# Patient Record
Sex: FEMALE | Race: WHITE | Employment: OTHER | URBAN - METROPOLITAN AREA
[De-identification: names, ages, dates, MRNs, and addresses within clinical notes are randomized per-mention and may not be internally consistent; named-entity substitution may affect disease eponyms.]

---

## 2018-10-09 ENCOUNTER — HOSPITAL ENCOUNTER (OUTPATIENT)
Dept: GENERAL RADIOLOGY | Age: 78
Discharge: HOME | End: 2018-10-11
Payer: COMMERCIAL

## 2018-10-09 DIAGNOSIS — Z12.31 ENCOUNTER FOR SCREENING MAMMOGRAM FOR MALIGNANT NEOPLASM OF BREAST: ICD-10-CM

## 2018-10-09 PROCEDURE — 77063 BREAST TOMOSYNTHESIS BI: CPT

## 2023-10-20 ENCOUNTER — PHARMACY VISIT (OUTPATIENT)
Dept: PHARMACY | Facility: CLINIC | Age: 83
End: 2023-10-20
Payer: COMMERCIAL

## 2023-10-20 ENCOUNTER — APPOINTMENT (OUTPATIENT)
Dept: CARDIOLOGY | Facility: HOSPITAL | Age: 83
End: 2023-10-20
Payer: MEDICARE

## 2023-10-20 ENCOUNTER — APPOINTMENT (OUTPATIENT)
Dept: RADIOLOGY | Facility: HOSPITAL | Age: 83
End: 2023-10-20
Payer: MEDICARE

## 2023-10-20 ENCOUNTER — HOSPITAL ENCOUNTER (EMERGENCY)
Facility: HOSPITAL | Age: 83
Discharge: HOME | End: 2023-10-20
Attending: EMERGENCY MEDICINE
Payer: MEDICARE

## 2023-10-20 VITALS
HEART RATE: 92 BPM | RESPIRATION RATE: 16 BRPM | DIASTOLIC BLOOD PRESSURE: 96 MMHG | WEIGHT: 180 LBS | SYSTOLIC BLOOD PRESSURE: 138 MMHG | BODY MASS INDEX: 29.99 KG/M2 | TEMPERATURE: 98.5 F | HEIGHT: 65 IN | OXYGEN SATURATION: 98 %

## 2023-10-20 DIAGNOSIS — I48.91 NEW ONSET ATRIAL FIBRILLATION (MULTI): Primary | ICD-10-CM

## 2023-10-20 DIAGNOSIS — U07.1 COVID-19: ICD-10-CM

## 2023-10-20 LAB
ALBUMIN SERPL BCP-MCNC: 3.9 G/DL (ref 3.4–5)
ALP SERPL-CCNC: 44 U/L (ref 33–136)
ALT SERPL W P-5'-P-CCNC: 20 U/L (ref 7–45)
ANION GAP BLDV CALCULATED.4IONS-SCNC: 9 MMOL/L (ref 10–25)
ANION GAP SERPL CALC-SCNC: 13 MMOL/L (ref 10–20)
APTT PPP: 31 SECONDS (ref 27–38)
AST SERPL W P-5'-P-CCNC: 33 U/L (ref 9–39)
BASE EXCESS BLDV CALC-SCNC: 3.1 MMOL/L (ref -2–3)
BASOPHILS # BLD AUTO: 0.02 X10*3/UL (ref 0–0.1)
BASOPHILS NFR BLD AUTO: 0.5 %
BILIRUB SERPL-MCNC: 0.4 MG/DL (ref 0–1.2)
BNP SERPL-MCNC: 213 PG/ML (ref 0–99)
BODY TEMPERATURE: 37 DEGREES CELSIUS
BUN SERPL-MCNC: 16 MG/DL (ref 6–23)
CA-I BLDV-SCNC: 1.19 MMOL/L (ref 1.1–1.33)
CALCIUM SERPL-MCNC: 8.9 MG/DL (ref 8.6–10.3)
CARDIAC TROPONIN I PNL SERPL HS: 16 NG/L (ref 0–13)
CARDIAC TROPONIN I PNL SERPL HS: 17 NG/L (ref 0–13)
CHLORIDE BLDV-SCNC: 100 MMOL/L (ref 98–107)
CHLORIDE SERPL-SCNC: 101 MMOL/L (ref 98–107)
CO2 SERPL-SCNC: 26 MMOL/L (ref 21–32)
CREAT SERPL-MCNC: 0.69 MG/DL (ref 0.5–1.05)
D DIMER PPP FEU-MCNC: 760 NG/ML FEU
EOSINOPHIL # BLD AUTO: 0.04 X10*3/UL (ref 0–0.4)
EOSINOPHIL NFR BLD AUTO: 0.9 %
ERYTHROCYTE [DISTWIDTH] IN BLOOD BY AUTOMATED COUNT: 13.1 % (ref 11.5–14.5)
GFR SERPL CREATININE-BSD FRML MDRD: 87 ML/MIN/1.73M*2
GLUCOSE BLDV-MCNC: 112 MG/DL (ref 74–99)
GLUCOSE SERPL-MCNC: 107 MG/DL (ref 74–99)
HCO3 BLDV-SCNC: 29.5 MMOL/L (ref 22–26)
HCT VFR BLD AUTO: 43.1 % (ref 36–46)
HCT VFR BLD EST: 44 % (ref 36–46)
HGB BLD-MCNC: 14.5 G/DL (ref 12–16)
HGB BLDV-MCNC: 14.6 G/DL (ref 12–16)
IMM GRANULOCYTES # BLD AUTO: 0.01 X10*3/UL (ref 0–0.5)
IMM GRANULOCYTES NFR BLD AUTO: 0.2 % (ref 0–0.9)
INHALED O2 CONCENTRATION: 21 %
INR PPP: 1.1 (ref 0.9–1.1)
LACTATE BLDV-SCNC: 1.2 MMOL/L (ref 0.4–2)
LYMPHOCYTES # BLD AUTO: 1.72 X10*3/UL (ref 0.8–3)
LYMPHOCYTES NFR BLD AUTO: 38.8 %
MCH RBC QN AUTO: 30.9 PG (ref 26–34)
MCHC RBC AUTO-ENTMCNC: 33.6 G/DL (ref 32–36)
MCV RBC AUTO: 92 FL (ref 80–100)
MONOCYTES # BLD AUTO: 0.56 X10*3/UL (ref 0.05–0.8)
MONOCYTES NFR BLD AUTO: 12.6 %
NEUTROPHILS # BLD AUTO: 2.08 X10*3/UL (ref 1.6–5.5)
NEUTROPHILS NFR BLD AUTO: 47 %
NRBC BLD-RTO: 0 /100 WBCS (ref 0–0)
OXYHGB MFR BLDV: 33.7 % (ref 45–75)
PCO2 BLDV: 51 MM HG (ref 41–51)
PH BLDV: 7.37 PH (ref 7.33–7.43)
PLATELET # BLD AUTO: 187 X10*3/UL (ref 150–450)
PMV BLD AUTO: 9.8 FL (ref 7.5–11.5)
PO2 BLDV: 25 MM HG (ref 35–45)
POTASSIUM BLDV-SCNC: 3.7 MMOL/L (ref 3.5–5.3)
POTASSIUM SERPL-SCNC: 4 MMOL/L (ref 3.5–5.3)
PROT SERPL-MCNC: 6.7 G/DL (ref 6.4–8.2)
PROTHROMBIN TIME: 12.3 SECONDS (ref 9.8–12.8)
RBC # BLD AUTO: 4.7 X10*6/UL (ref 4–5.2)
SAO2 % BLDV: 34 % (ref 45–75)
SARS-COV-2 RNA RESP QL NAA+PROBE: DETECTED
SODIUM BLDV-SCNC: 135 MMOL/L (ref 136–145)
SODIUM SERPL-SCNC: 136 MMOL/L (ref 136–145)
WBC # BLD AUTO: 4.4 X10*3/UL (ref 4.4–11.3)

## 2023-10-20 PROCEDURE — 93005 ELECTROCARDIOGRAM TRACING: CPT

## 2023-10-20 PROCEDURE — 82330 ASSAY OF CALCIUM: CPT | Performed by: NURSE PRACTITIONER

## 2023-10-20 PROCEDURE — 36415 COLL VENOUS BLD VENIPUNCTURE: CPT | Performed by: NURSE PRACTITIONER

## 2023-10-20 PROCEDURE — 71045 X-RAY EXAM CHEST 1 VIEW: CPT | Mod: FY

## 2023-10-20 PROCEDURE — 82435 ASSAY OF BLOOD CHLORIDE: CPT | Performed by: NURSE PRACTITIONER

## 2023-10-20 PROCEDURE — 85610 PROTHROMBIN TIME: CPT | Performed by: NURSE PRACTITIONER

## 2023-10-20 PROCEDURE — 71045 X-RAY EXAM CHEST 1 VIEW: CPT | Performed by: RADIOLOGY

## 2023-10-20 PROCEDURE — 87635 SARS-COV-2 COVID-19 AMP PRB: CPT | Performed by: NURSE PRACTITIONER

## 2023-10-20 PROCEDURE — 99284 EMERGENCY DEPT VISIT MOD MDM: CPT | Performed by: EMERGENCY MEDICINE

## 2023-10-20 PROCEDURE — 83880 ASSAY OF NATRIURETIC PEPTIDE: CPT | Performed by: NURSE PRACTITIONER

## 2023-10-20 PROCEDURE — RXMED WILLOW AMBULATORY MEDICATION CHARGE

## 2023-10-20 PROCEDURE — 99283 EMERGENCY DEPT VISIT LOW MDM: CPT | Mod: 25

## 2023-10-20 PROCEDURE — 84484 ASSAY OF TROPONIN QUANT: CPT | Mod: 59 | Performed by: NURSE PRACTITIONER

## 2023-10-20 PROCEDURE — 85025 COMPLETE CBC W/AUTO DIFF WBC: CPT | Performed by: NURSE PRACTITIONER

## 2023-10-20 PROCEDURE — 85730 THROMBOPLASTIN TIME PARTIAL: CPT | Performed by: NURSE PRACTITIONER

## 2023-10-20 PROCEDURE — 85379 FIBRIN DEGRADATION QUANT: CPT | Performed by: EMERGENCY MEDICINE

## 2023-10-20 ASSESSMENT — CHA2DS2 SCORE
SEX: FEMALE
CHA2D2S VASC SCORE: 5
AGE IN YEARS: 75+
VASCULAR DISEASE: YES
HYPERTENSION: YES
DIABETES: NO
CHF OR LEFT VENTRICULAR DYSFUNCTION: NO
PRIOR STROKE OR TIA OR THROMBOEMBOLISM: NO

## 2023-10-20 ASSESSMENT — PAIN - FUNCTIONAL ASSESSMENT: PAIN_FUNCTIONAL_ASSESSMENT: 0-10

## 2023-10-20 ASSESSMENT — PAIN DESCRIPTION - PAIN TYPE: TYPE: ACUTE PAIN

## 2023-10-20 ASSESSMENT — PAIN DESCRIPTION - LOCATION: LOCATION: OTHER (COMMENT)

## 2023-10-20 NOTE — ED PROVIDER NOTES
HPI   Chief Complaint   Patient presents with    covid positive/ new onset afib       83-year-old female with a past history of hypothyroidism, hyperlipidemia, CAD with a CABG 12 to 13 years ago presents the emergency department today for new onset atrial fib.  Patient was seen at his her primary care provider office today for a COVID diagnosis with increasing fatigue was found to be in A-fib at that time.  She was did recently have an echocardiogram performed last week with her cardiologist which was within normal limits.  Denies any chest pain.  She is having slight dyspnea.  No lower extremity swelling.  Feeling overall well otherwise.  Slight cough nonproductive no hemoptysis.                    [id number] Score: 5       [place] Coma Scale Score: 15                  Patient History   No past medical history on file.  No past surgical history on file.  No family history on file.  Social History     Tobacco Use    Smoking status: Not on file    Smokeless tobacco: Not on file   Substance Use Topics    Alcohol use: Not on file    Drug use: Not on file       Physical Exam   ED Triage Vitals [10/20/23 1505]   Temp Heart Rate Resp BP   36.9 °C (98.5 °F) 104 18 108/55      SpO2 Temp Source Heart Rate Source Patient Position   97 % Temporal Monitor --      BP Location FiO2 (%)     -- --       Physical Exam  Vitals and nursing note reviewed.   Constitutional:       General: She is not in acute distress.     Appearance: Normal appearance. She is not toxic-appearing.   HENT:      Right Ear: Tympanic membrane normal.      Left Ear: Tympanic membrane normal.      Mouth/Throat:      Mouth: Mucous membranes are moist.      Pharynx: Oropharynx is clear.   Eyes:      Extraocular Movements: Extraocular movements intact.      Pupils: Pupils are equal, round, and reactive to light.   Cardiovascular:      Rate and Rhythm: Regular rhythm. Tachycardia present.      Pulses: Normal pulses.      Heart sounds: Normal heart sounds.       Comments: Irregularly irregular.  Pulmonary:      Effort: Pulmonary effort is normal.      Breath sounds: Normal breath sounds.   Abdominal:      General: Abdomen is flat. Bowel sounds are normal.      Palpations: Abdomen is soft.      Tenderness: There is no abdominal tenderness.   Musculoskeletal:         General: Normal range of motion.      Cervical back: Normal range of motion and neck supple.   Skin:     General: Skin is warm and dry.      Capillary Refill: Capillary refill takes less than 2 seconds.   Neurological:      General: No focal deficit present.      Mental Status: She is alert and oriented to person, place, and time.   Psychiatric:         Mood and Affect: Mood normal.         Behavior: Behavior normal.         Judgment: Judgment normal.         Labs Reviewed   COMPREHENSIVE METABOLIC PANEL - Abnormal       Result Value    Glucose 107 (*)     Sodium 136      Potassium 4.0      Chloride 101      Bicarbonate 26      Anion Gap 13      Urea Nitrogen 16      Creatinine 0.69      eGFR 87      Calcium 8.9      Albumin 3.9      Alkaline Phosphatase 44      Total Protein 6.7      AST 33      Bilirubin, Total 0.4      ALT 20     BLOOD GAS VENOUS FULL PANEL - Abnormal    POCT pH, Venous 7.37      POCT pCO2, Venous 51      POCT pO2, Venous 25 (*)     POCT SO2, Venous 34 (*)     POCT Oxy Hemoglobin, Venous 33.7 (*)     POCT Hematocrit Calculated, Venous 44.0      POCT Sodium, Venous 135 (*)     POCT Potassium, Venous 3.7      POCT Chloride, Venous 100      POCT Ionized Calicum, Venous 1.19      POCT Glucose, Venous 112 (*)     POCT Lactate, Venous 1.2      POCT Base Excess, Venous 3.1 (*)     POCT HCO3 Calculated, Venous 29.5 (*)     POCT Hemoglobin, Venous 14.6      POCT Anion Gap, Venous 9.0 (*)     Patient Temperature 37.0      FiO2 21     TROPONIN I, HIGH SENSITIVITY - Abnormal    Troponin I, High Sensitivity 17 (*)     Narrative:     Less than 99th percentile of normal range cutoff-  Female and children  under 18 years old <14 ng/L; Male <21 ng/L: Negative  Repeat testing should be performed if clinically indicated.     Female and children under 18 years old 14-50 ng/L; Male 21-50 ng/L:  Consistent with possible cardiac damage and possible increased clinical   risk. Serial measurements may help to assess extent of myocardial damage.     >50 ng/L: Consistent with cardiac damage, increased clinical risk and  myocardial infarction. Serial measurements may help assess extent of   myocardial damage.      NOTE: Children less than 1 year old may have higher baseline troponin   levels and results should be interpreted in conjunction with the overall   clinical context.     NOTE: Troponin I testing is performed using a different   testing methodology at [facility] than at other   [facility]. Direct result comparisons should only   be made within the same method.   B-TYPE NATRIURETIC PEPTIDE - Abnormal     (*)     Narrative:        <100 pg/mL - Heart failure unlikely  100-299 pg/mL - Intermediate probability of acute heart                  failure exacerbation. Correlate with clinical                  context and patient history.    >=300 pg/mL - Heart Failure likely. Correlate with clinical                  context and patient history.    BNP testing is performed using different testing methodology at [facility] than at other [facility]. Direct result comparisons should only be made within the same method.      SARS-COV-2 PCR, SYMPTOMATIC - Abnormal    Coronavirus 2019, PCR Detected (*)     Narrative:     This assay has received FDA Emergency Use Authorization (EUA) and is only authorized for the duration of time that circumstances exist to justify the authorization of the emergency use of in vitro diagnostic tests for the detection of SARS-CoV-2 virus and/or diagnosis of COVID-19 infection under section 564(b)(1) of the Act, 21 U.S.C. 360bbb-3(b)(1). This assay is an in vitro  diagnostic nucleic acid amplification test for the qualitative detection of SARS-CoV-2 from nasopharyngeal specimens and has been validated for use at [facility]. Negative results do not preclude COVID-19 infections and should not be used as the sole basis for diagnosis, treatment, or other management decisions.     D-DIMER, VTE EXCLUSION - Abnormal    D-Dimer, Quantitative VTE Exclusion 760 (*)     Narrative:     The VTE Exclusion D-Dimer assay is reported in ng/mL Fibrinogen Equivalent Units (FEU).    Per 's instructions for use, a value of less than 500 ng/mL (FEU) may help to exclude DVT or PE in outpatients when the assay is used with a clinical pretest probability assessment.(AE must utilize and document eCalc 'Wells Score Deep Vein Thrombosis Risk' for DVT exclusion only. Emergency Department should utilize  Guidelines for Emergency Department Use of the VTE Exclusion D-Dimer and Clinical Pretest probability assessment model for DVT or PE exclusion.)   TROPONIN I, HIGH SENSITIVITY - Abnormal    Troponin I, High Sensitivity 16 (*)     Narrative:     Less than 99th percentile of normal range cutoff-  Female and children under 18 years old <14 ng/L; Male <21 ng/L: Negative  Repeat testing should be performed if clinically indicated.     Female and children under 18 years old 14-50 ng/L; Male 21-50 ng/L:  Consistent with possible cardiac damage and possible increased clinical   risk. Serial measurements may help to assess extent of myocardial damage.     >50 ng/L: Consistent with cardiac damage, increased clinical risk and  myocardial infarction. Serial measurements may help assess extent of   myocardial damage.      NOTE: Children less than 1 year old may have higher baseline troponin   levels and results should be interpreted in conjunction with the overall   clinical context.     NOTE: Troponin I testing is performed using a different   testing methodology at [facility] than at other   system hospitals. Direct result comparisons should only   be made within the same method.   PROTIME-INR - Normal    Protime 12.3      INR 1.1     APTT - Normal    aPTT 31      Narrative:     The APTT is no longer used for monitoring Unfractionated Heparin Therapy. For monitoring Heparin Therapy, use the Heparin Assay.   CBC WITH AUTO DIFFERENTIAL    WBC 4.4      nRBC 0.0      RBC 4.70      Hemoglobin 14.5      Hematocrit 43.1      MCV 92      MCH 30.9      MCHC 33.6      RDW 13.1      Platelets 187      MPV 9.8      Neutrophils % 47.0      Immature Granulocytes %, Automated 0.2      Lymphocytes % 38.8      Monocytes % 12.6      Eosinophils % 0.9      Basophils % 0.5      Neutrophils Absolute 2.08      Immature Granulocytes Absolute, Automated 0.01      Lymphocytes Absolute 1.72      Monocytes Absolute 0.56      Eosinophils Absolute 0.04      Basophils Absolute 0.02       Pain Management Panel           No data to display              XR chest 1 view   Final Result   No acute cardiopulmonary disease.        MACRO:   none        Signed by: Anna Corea 10/20/2023 4:29 PM   Dictation workstation:   [id number]          ED Course & [facility]   ED Course as of 10/20/23 1804   Fri Oct 20, 2023   1553 D-Dimer, Quantitative Non VTE  Changed to VTE DDimer [WJ]   1622 Coronavirus 2019, PCR(!): Detected [WJ]   1622 D-Dimer, Quantitative VTE Exclusion(!): 760  Age adjusted negative, no CT needed [WJ]      ED Course User Index  [WJ] Blane Cabrera,          Diagnoses as of 10/20/23 1804   New onset atrial fibrillation (CMS/HCC)   COVID-19       Medical Decision Making  On initial evaluation patient is well-appearing in the emergency department her heart rate is anywhere between 10 4-1 20 initially.  She was given a 500 cc bolus.  Since IV fluids patient's heart rate has maintained between 88-1 08.  She has remained normotensive.  She was ambulated with pulse ox remained greater than 96%.  I was able to  get a hold of her cardiologist with [facility] who does verify a normal echocardiogram.  He recommends increasing her Coreg from 6.25 twice daily to 12-1/2 twice daily and started on Eliquis.  States that he will see her in the office on October 30 for an outpatient stress test.  I sat down for an extensive mount of time and discussed this with the patient.  She would like to go home at this time.  She feels comfortable with our plan.  Initial troponin of 17 with a repeat of 16 COVID is positive BNP of 213 CMP within normal limits D-dimer age-adjusted no need for CT PE CBC shows leukocytosis or signs of anemia chest x-ray shows no acute cardiopulmonary disease.  Patient is agreeable to the plan.  I did speak with pharmacy who will perform meds to beds for Eliquis.  I wrote down all of her outpatient follow-up and increasing in her doses I also discussed strict return precautions and risks and benefits of DOAC.  She is agreeable to this plan has no further questions or concerns she will be discharged home in stable condition.        Procedure  ECG 12 lead    Performed by: BARTOLOME Gutierrez  Authorized by: BARTOLOME Gutierrez    Comments:      New onset atrial fib rate of 126 QRS 92 QTc 41 there is no ST elevation.        I discussed the differential, results and discharge plan with the patient and/or family/friend/caregiver if present.  I emphasized the importance of follow-up with the physician I referred them to in the timeframe recommended.  I explained reasons for the patient to return to the Emergency Department. Additional verbal discharge instructions were also given and discussed with the patient to supplement those generated by the EMR. We also discussed medications that were prescribed (if any) including common side effects and interactions. The patient was advised to abstain from driving, operating heavy machinery or making significant decisions while taking medications such as  opiates and muscle relaxers that may impair this. All questions were addressed.  They understand return precautions and discharge instructions. The patient and/or family/friend/caregiver expressed understanding.           Ioana Zuniga, APRN-CNP  10/20/23 6868

## 2023-10-20 NOTE — DISCHARGE INSTRUCTIONS
INCREASE YOUR CARVEDILOL FROM 6.25 TWICE A DAY TO 12.5 MG TWICE A DAY. YOU WILL SEE YOUR CARDIOLOGST ON OCTOBER 30TH.

## 2024-01-27 ENCOUNTER — APPOINTMENT (OUTPATIENT)
Dept: CARDIOLOGY | Facility: HOSPITAL | Age: 84
End: 2024-01-27
Payer: MEDICARE

## 2024-01-27 ENCOUNTER — HOSPITAL ENCOUNTER (EMERGENCY)
Facility: HOSPITAL | Age: 84
Discharge: HOME | End: 2024-01-27
Attending: EMERGENCY MEDICINE
Payer: MEDICARE

## 2024-01-27 ENCOUNTER — APPOINTMENT (OUTPATIENT)
Dept: RADIOLOGY | Facility: HOSPITAL | Age: 84
End: 2024-01-27
Payer: MEDICARE

## 2024-01-27 VITALS
DIASTOLIC BLOOD PRESSURE: 58 MMHG | HEIGHT: 66 IN | SYSTOLIC BLOOD PRESSURE: 155 MMHG | WEIGHT: 180 LBS | OXYGEN SATURATION: 96 % | BODY MASS INDEX: 28.93 KG/M2 | HEART RATE: 57 BPM | TEMPERATURE: 98.1 F | RESPIRATION RATE: 18 BRPM

## 2024-01-27 DIAGNOSIS — I10 HYPERTENSION, UNSPECIFIED TYPE: ICD-10-CM

## 2024-01-27 DIAGNOSIS — R51.9 ACUTE NONINTRACTABLE HEADACHE, UNSPECIFIED HEADACHE TYPE: Primary | ICD-10-CM

## 2024-01-27 LAB
ALBUMIN SERPL BCP-MCNC: 3.9 G/DL (ref 3.4–5)
ALP SERPL-CCNC: 60 U/L (ref 33–136)
ALT SERPL W P-5'-P-CCNC: 23 U/L (ref 7–45)
ANION GAP SERPL CALC-SCNC: 10 MMOL/L (ref 10–20)
AST SERPL W P-5'-P-CCNC: 27 U/L (ref 9–39)
BASOPHILS # BLD AUTO: 0.07 X10*3/UL (ref 0–0.1)
BASOPHILS NFR BLD AUTO: 1.1 %
BILIRUB SERPL-MCNC: 0.4 MG/DL (ref 0–1.2)
BNP SERPL-MCNC: 288 PG/ML (ref 0–99)
BUN SERPL-MCNC: 22 MG/DL (ref 6–23)
CALCIUM SERPL-MCNC: 9 MG/DL (ref 8.6–10.3)
CARDIAC TROPONIN I PNL SERPL HS: 9 NG/L (ref 0–13)
CHLORIDE SERPL-SCNC: 102 MMOL/L (ref 98–107)
CO2 SERPL-SCNC: 29 MMOL/L (ref 21–32)
CREAT SERPL-MCNC: 0.52 MG/DL (ref 0.5–1.05)
EGFRCR SERPLBLD CKD-EPI 2021: >90 ML/MIN/1.73M*2
EOSINOPHIL # BLD AUTO: 0.3 X10*3/UL (ref 0–0.4)
EOSINOPHIL NFR BLD AUTO: 4.8 %
ERYTHROCYTE [DISTWIDTH] IN BLOOD BY AUTOMATED COUNT: 14 % (ref 11.5–14.5)
GLUCOSE SERPL-MCNC: 106 MG/DL (ref 74–99)
HCT VFR BLD AUTO: 38.5 % (ref 36–46)
HGB BLD-MCNC: 12.6 G/DL (ref 12–16)
IMM GRANULOCYTES # BLD AUTO: 0.01 X10*3/UL (ref 0–0.5)
IMM GRANULOCYTES NFR BLD AUTO: 0.2 % (ref 0–0.9)
LYMPHOCYTES # BLD AUTO: 2.11 X10*3/UL (ref 0.8–3)
LYMPHOCYTES NFR BLD AUTO: 33.8 %
MCH RBC QN AUTO: 30.3 PG (ref 26–34)
MCHC RBC AUTO-ENTMCNC: 32.7 G/DL (ref 32–36)
MCV RBC AUTO: 93 FL (ref 80–100)
MONOCYTES # BLD AUTO: 0.8 X10*3/UL (ref 0.05–0.8)
MONOCYTES NFR BLD AUTO: 12.8 %
NEUTROPHILS # BLD AUTO: 2.95 X10*3/UL (ref 1.6–5.5)
NEUTROPHILS NFR BLD AUTO: 47.3 %
NRBC BLD-RTO: 0 /100 WBCS (ref 0–0)
PLATELET # BLD AUTO: 184 X10*3/UL (ref 150–450)
POTASSIUM SERPL-SCNC: 4.3 MMOL/L (ref 3.5–5.3)
PROT SERPL-MCNC: 6.5 G/DL (ref 6.4–8.2)
RBC # BLD AUTO: 4.16 X10*6/UL (ref 4–5.2)
SODIUM SERPL-SCNC: 137 MMOL/L (ref 136–145)
WBC # BLD AUTO: 6.2 X10*3/UL (ref 4.4–11.3)

## 2024-01-27 PROCEDURE — 93005 ELECTROCARDIOGRAM TRACING: CPT

## 2024-01-27 PROCEDURE — 2500000004 HC RX 250 GENERAL PHARMACY W/ HCPCS (ALT 636 FOR OP/ED): Performed by: EMERGENCY MEDICINE

## 2024-01-27 PROCEDURE — 96374 THER/PROPH/DIAG INJ IV PUSH: CPT

## 2024-01-27 PROCEDURE — 99284 EMERGENCY DEPT VISIT MOD MDM: CPT | Performed by: EMERGENCY MEDICINE

## 2024-01-27 PROCEDURE — 70450 CT HEAD/BRAIN W/O DYE: CPT | Performed by: RADIOLOGY

## 2024-01-27 PROCEDURE — 2500000001 HC RX 250 WO HCPCS SELF ADMINISTERED DRUGS (ALT 637 FOR MEDICARE OP): Performed by: EMERGENCY MEDICINE

## 2024-01-27 PROCEDURE — 84484 ASSAY OF TROPONIN QUANT: CPT | Performed by: EMERGENCY MEDICINE

## 2024-01-27 PROCEDURE — 84075 ASSAY ALKALINE PHOSPHATASE: CPT | Performed by: EMERGENCY MEDICINE

## 2024-01-27 PROCEDURE — 83880 ASSAY OF NATRIURETIC PEPTIDE: CPT | Performed by: EMERGENCY MEDICINE

## 2024-01-27 PROCEDURE — 70450 CT HEAD/BRAIN W/O DYE: CPT

## 2024-01-27 PROCEDURE — 36415 COLL VENOUS BLD VENIPUNCTURE: CPT | Performed by: EMERGENCY MEDICINE

## 2024-01-27 PROCEDURE — 85025 COMPLETE CBC W/AUTO DIFF WBC: CPT | Performed by: EMERGENCY MEDICINE

## 2024-01-27 PROCEDURE — 99285 EMERGENCY DEPT VISIT HI MDM: CPT | Mod: 27

## 2024-01-27 RX ORDER — PROCHLORPERAZINE EDISYLATE 5 MG/ML
5 INJECTION INTRAMUSCULAR; INTRAVENOUS ONCE
Status: COMPLETED | OUTPATIENT
Start: 2024-01-27 | End: 2024-01-27

## 2024-01-27 RX ORDER — CLONIDINE HYDROCHLORIDE 0.1 MG/1
0.1 TABLET ORAL ONCE
Status: COMPLETED | OUTPATIENT
Start: 2024-01-27 | End: 2024-01-27

## 2024-01-27 RX ADMIN — CLONIDINE HYDROCHLORIDE 0.1 MG: 0.1 TABLET ORAL at 07:02

## 2024-01-27 RX ADMIN — PROCHLORPERAZINE EDISYLATE 5 MG: 5 INJECTION INTRAMUSCULAR; INTRAVENOUS at 04:13

## 2024-01-27 ASSESSMENT — LIFESTYLE VARIABLES
HAVE PEOPLE ANNOYED YOU BY CRITICIZING YOUR DRINKING: NO
HAVE YOU EVER FELT YOU SHOULD CUT DOWN ON YOUR DRINKING: NO
REASON UNABLE TO ASSESS: NO
EVER FELT BAD OR GUILTY ABOUT YOUR DRINKING: NO
EVER HAD A DRINK FIRST THING IN THE MORNING TO STEADY YOUR NERVES TO GET RID OF A HANGOVER: NO

## 2024-01-27 ASSESSMENT — COLUMBIA-SUICIDE SEVERITY RATING SCALE - C-SSRS
1. IN THE PAST MONTH, HAVE YOU WISHED YOU WERE DEAD OR WISHED YOU COULD GO TO SLEEP AND NOT WAKE UP?: NO
2. HAVE YOU ACTUALLY HAD ANY THOUGHTS OF KILLING YOURSELF?: NO
6. HAVE YOU EVER DONE ANYTHING, STARTED TO DO ANYTHING, OR PREPARED TO DO ANYTHING TO END YOUR LIFE?: NO

## 2024-01-27 ASSESSMENT — PAIN - FUNCTIONAL ASSESSMENT: PAIN_FUNCTIONAL_ASSESSMENT: 0-10

## 2024-01-27 NOTE — ED PROVIDER NOTES
Jeane Gao is a 83 y.o. patient presenting to the ED for hypertension.  She states that she was doing housework when she checked her blood pressure and found it to be elevated.  She was recently prescribed losartan to treat hypertension however it was not ready at the pharmacy and so she has not yet picked this up.  She did take one of her husbands losartan tablets however her blood pressures continue to go up.  She states that since her blood pressure has been rising she has developed headache and generally feels poorly.  She does have some mild associated nausea.  The headache was not sudden in onset nor is it especially severe.  No chest pain, shortness of breath, or other associated symptoms.    Additional History Obtained from: None  Limitations to History: None  ------------------------------------------------------------------------------------------------------------------------------------------  Physical Exam:  Appearance: Alert, cooperative,   Skin: Warm, dry, appropriate color for ethnicity.  Eyes: Cornea clear. No scleral icterus or injection.   ENT: Mucous membranes moist.  Pulmonary: No accessory muscle use or stridor. Clear lung sounds bilaterally without rhonchi or wheezing.   Cardiac: Heart sounds regular without murmur. B/L radial pulses full and symmetric.   Abdomen: Soft, not tender.  No rebound or guarding.   Musculoskeletal: No gross deformities.   Neurological: Face symmetrical. Voice clear. Appropriately conversant.  Motor and sensation intact x 4 extremities.  Normal finger-to-nose.  Psychiatric: Appropriate mood and affect.    Medical Decision Making:  Chronic Medical Conditions Significantly Affecting Care:  has no past medical history on file.  Hypertension    Social Determinants of Health Significantly Affecting Care: None identified    Differential Diagnosis Considered but not limited to: Elevated blood pressure with headache is potentially concerning however given the lack of  severity and onset after blood pressure had been rising I have overall lower suspicion for intracranial hemorrhage.  She does not have any findings suggestive of stroke.  No other other exam findings to suggest hypertensive emergency.      External Records Reviewed:   I reviewed recent and relevant outside records including:     Independent Interpretation of Studies: The following studies were ordered as part of the emergency department work up and independently interpreted by me. See ED Course for details.    EKG performed at 0257 and interpreted by me shows sinus rhythm at a rate of 51.  There is a first-degree AV block with otherwise normal intervals.  The axis is normal.  There are no ST elevations or depressions.  No T wave changes.  No STEMI.    CBC, CMP are within normal limits.  Troponin is normal.  BNP is slightly elevated without evidence of pulmonary edema on exam.    CT head is without any evidence of intracranial hemorrhage on my interpretation.  Confirmed by radiology.    Diagnoses as of 01/27/24 0735   Acute nonintractable headache, unspecified headache type   Hypertension, unspecified type         Escalation of Care:  Patient was treated with Compazine for her headache and nausea.  She did have resolution of the symptoms with this.  CT was performed less than 6 hours from onset of headache ruling out subarachnoid hemorrhage.  Remainder of workup is without evidence of hypertensive emergency.    Given that the patient does seem to be having symptomatic hypertension she was treated with clonidine.  Her blood pressure did improve with this.  She was instructed on the need to  her blood pressure medicine and take as prescribed.  Follow-up and return precautions were discussed.       Katia Alonso DO  01/29/24 4070

## 2024-01-27 NOTE — ED TRIAGE NOTES
Pt presents to the ED for HTN. States that she was working about the house and took her BP. She states that it was in the 190's. She did take 5mg of her 's losartan that she is also prescribed but has not yet picked up. Pt states that she took her BP multiple times after that and ghat the numbers kept going up. Pt alert and oriented x's 4. Pt also states that she feels dizzy/weak all over and SOB

## 2024-01-29 ENCOUNTER — TRANSCRIBE ORDERS (OUTPATIENT)
Age: 84
End: 2024-01-29

## 2024-01-29 DIAGNOSIS — I42.8 CARDIOMYOPATHY, NONCORONARY (HCC): Primary | ICD-10-CM

## 2024-01-31 LAB
ATRIAL RATE: 50 BPM
P AXIS: 77 DEGREES
PR INTERVAL: 232 MS
Q ONSET: 249 MS
QRS COUNT: 8 BEATS
QRS DURATION: 102 MS
QT INTERVAL: 489 MS
QTC CALCULATION(BAZETT): 451 MS
QTC FREDERICIA: 463 MS
R AXIS: 43 DEGREES
T AXIS: 70 DEGREES
T OFFSET: 494 MS
VENTRICULAR RATE: 51 BPM

## 2024-04-04 ENCOUNTER — OFFICE VISIT (OUTPATIENT)
Dept: PULMONOLOGY | Facility: HOSPITAL | Age: 84
End: 2024-04-04
Payer: MEDICARE

## 2024-04-04 VITALS
WEIGHT: 207.2 LBS | DIASTOLIC BLOOD PRESSURE: 50 MMHG | OXYGEN SATURATION: 95 % | RESPIRATION RATE: 16 BRPM | HEIGHT: 64 IN | SYSTOLIC BLOOD PRESSURE: 146 MMHG | TEMPERATURE: 97.8 F | HEART RATE: 46 BPM | BODY MASS INDEX: 35.37 KG/M2

## 2024-04-04 DIAGNOSIS — R05.9 COUGH, UNSPECIFIED TYPE: Primary | ICD-10-CM

## 2024-04-04 DIAGNOSIS — R06.09 DOE (DYSPNEA ON EXERTION): ICD-10-CM

## 2024-04-04 PROCEDURE — 1159F MED LIST DOCD IN RCRD: CPT | Performed by: NURSE PRACTITIONER

## 2024-04-04 PROCEDURE — 99214 OFFICE O/P EST MOD 30 MIN: CPT | Performed by: NURSE PRACTITIONER

## 2024-04-04 PROCEDURE — 1036F TOBACCO NON-USER: CPT | Performed by: NURSE PRACTITIONER

## 2024-04-04 PROCEDURE — 99204 OFFICE O/P NEW MOD 45 MIN: CPT | Performed by: NURSE PRACTITIONER

## 2024-04-04 RX ORDER — EZETIMIBE 10 MG/1
10 TABLET ORAL DAILY
COMMUNITY

## 2024-04-04 RX ORDER — CLOPIDOGREL BISULFATE 75 MG/1
75 TABLET ORAL DAILY
COMMUNITY

## 2024-04-04 RX ORDER — LEVOTHYROXINE SODIUM 137 UG/1
137 TABLET ORAL DAILY
COMMUNITY
Start: 2024-02-12

## 2024-04-04 RX ORDER — AMIODARONE HYDROCHLORIDE 200 MG/1
200 TABLET ORAL 2 TIMES DAILY
COMMUNITY
Start: 2024-03-22

## 2024-04-04 RX ORDER — BUDESONIDE, GLYCOPYRROLATE, AND FORMOTEROL FUMARATE 160; 9; 4.8 UG/1; UG/1; UG/1
2 AEROSOL, METERED RESPIRATORY (INHALATION) 2 TIMES DAILY
COMMUNITY
Start: 2024-03-27

## 2024-04-04 RX ORDER — ATORVASTATIN CALCIUM 80 MG/1
80 TABLET, FILM COATED ORAL
COMMUNITY

## 2024-04-04 RX ORDER — ALBUTEROL SULFATE 90 UG/1
2 AEROSOL, METERED RESPIRATORY (INHALATION) EVERY 4 HOURS PRN
Qty: 18 G | Refills: 3 | Status: SHIPPED | OUTPATIENT
Start: 2024-04-04

## 2024-04-04 RX ORDER — INHALER, ASSIST DEVICES
SPACER (EA) MISCELLANEOUS
Qty: 1 EACH | Refills: 1 | Status: SHIPPED | OUTPATIENT
Start: 2024-04-04

## 2024-04-04 RX ORDER — METOPROLOL SUCCINATE 100 MG/1
TABLET, EXTENDED RELEASE ORAL
COMMUNITY
Start: 2024-04-01

## 2024-04-04 RX ORDER — ISOSORBIDE MONONITRATE 30 MG/1
TABLET, EXTENDED RELEASE ORAL
COMMUNITY
Start: 2023-12-27

## 2024-04-04 RX ORDER — ASPIRIN 81 MG/1
81 TABLET ORAL
COMMUNITY
Start: 2016-09-02

## 2024-04-04 RX ORDER — CYANOCOBALAMIN 1000 UG/ML
INJECTION, SOLUTION INTRAMUSCULAR; SUBCUTANEOUS
COMMUNITY

## 2024-04-04 RX ORDER — LOSARTAN POTASSIUM 50 MG/1
50 TABLET ORAL DAILY
COMMUNITY
Start: 2024-03-19

## 2024-04-04 RX ORDER — FUROSEMIDE 40 MG/1
40 TABLET ORAL DAILY
COMMUNITY
Start: 2024-03-21

## 2024-04-04 ASSESSMENT — ENCOUNTER SYMPTOMS
OCCASIONAL FEELINGS OF UNSTEADINESS: 0
LOSS OF SENSATION IN FEET: 0
DEPRESSION: 0

## 2024-04-04 ASSESSMENT — PATIENT HEALTH QUESTIONNAIRE - PHQ9
SUM OF ALL RESPONSES TO PHQ9 QUESTIONS 1 AND 2: 0
1. LITTLE INTEREST OR PLEASURE IN DOING THINGS: NOT AT ALL
2. FEELING DOWN, DEPRESSED OR HOPELESS: NOT AT ALL

## 2024-04-04 ASSESSMENT — COLUMBIA-SUICIDE SEVERITY RATING SCALE - C-SSRS
6. HAVE YOU EVER DONE ANYTHING, STARTED TO DO ANYTHING, OR PREPARED TO DO ANYTHING TO END YOUR LIFE?: NO
2. HAVE YOU ACTUALLY HAD ANY THOUGHTS OF KILLING YOURSELF?: NO
1. IN THE PAST MONTH, HAVE YOU WISHED YOU WERE DEAD OR WISHED YOU COULD GO TO SLEEP AND NOT WAKE UP?: NO

## 2024-04-04 NOTE — PATIENT INSTRUCTIONS
"Ms. Gao,    It was a pleasure to see you in clinic today. We discussed your shortness of breath.    Based on our conversation, here are my recommendations:   - Continue your Breztri 2 puffs twice daily (RINSE MOUTH & SPIT AFTER USE) -- please start using spacer with your inhaler  - Start albuterol Inhaler 1-2 puffs every 4-6 hours as needed for shortness of breath. You can also take this 10-15 minutes prior to exertional activity to help \"prime\" your lungs.  - I ordered breathing tests (PFT & 6-minute walk) -- do not use Breztri for 48 hours prior to lung function tests  - I ordered a CT of your chest, please have this completed    - Please call your cardiologist ASAP about your leg swelling and 10 lb weight gain over the last month.     Thank you for visiting the Pulmonary clinic today!   Return to clinic 2 weeks after PFT & CT or sooner if needed   Nhung Rodriguez CNP  My office number is [phone] - Mena is my  and Louise is my nurse.     [phone] - scheduling    Any test results will be discussed at next visit -- please make sure to make a follow up appt after testing.    "

## 2024-04-04 NOTE — PROGRESS NOTES
" Patient: Jeane Gao    [id number]  : 1940 -- AGE 83 y.o.    Provider: BARTOLOME Ricks     Location [facility]   Service Date: 2024       Department of Medicine  Division of Pulmonary, Critical Care, and Sleep Medicine       [facility]  New Visit Note      HISTORY OF PRESENT ILLNESS     The patient's referring provider is: No ref. provider found    Chief complaint:   Jeane Gao is a 83 y.o. female who presents to a [facility] for an evaluation with concerns of No chief complaint on file..     HISTORY OF PRESENT ILLNESS   Ms. Gao is a 83 year old female (former smoker, quit , ~5 pack year history) with a PMHx of COVID (10/2023), HTN, L carotid endarterectomy (2020), CAD c/b MI s/p CABG x 3 (), hiatal hernia. Here for initial evaluation for shortness of breath.     I have independently interviewed and examined the patient in the office and reviewed available records.    PCP:     DATE OF LAST VISIT: N/A, NPV    Current History    On today's visit, the patient reports \"I am okay as long as I don't do anything\". She had COVID 10/2023 c/b a-fib s/p cardioversion and since then has had shortness of breath, feels it started more so after starting amiodarone. She has been on Breztri for a little over a month but does not notice much improvement in her breathing.  She has occasional dry, non-productive cough. She denies wheezing, nasal congestion, post-nasal drip, or chest pain.  She has occasional heart burn and occasionally has issues swallowing and feels is related to her hiatal hernia, has not seen general surgeon for hernia.  She has significant SOB when walking even short distances, she is unable to ride her exercise bike anymore.  She has worsening BLE edema and 10 lb weight gain over last month. Denies orthopnea.     Sleep: denies snoring, apnea, daytime drowsiness. Never had sleep " study    Denies premature birth. No childhood pulmonary issues growing up. No pulmonary hospitalizations. Has never been on home oxygen therapy before.       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19)  -Exposures: Denies known exposures to asbestos, silica, beryllium, chemicals/fumes, molds or dusts    Fam Hx:  - parents passed in MVA when she was young and unsure of their history  - brother: a-fib    Prior Notes & History       REVIEW OF SYSTEMS     REVIEW OF SYSTEMS  Review of Systems  10-point ROS complete and negative except as documented in HPI      ALLERGIES AND MEDICATIONS     ALLERGIES  Allergies   Allergen Reactions    Codeine Rash     Dizzy and difficulty breathing     Dizzy and difficulty breathing    Erythromycin Nausea Only and Rash     Dizziness     Dizziness       MEDICATIONS  Current Outpatient Medications   Medication Sig Dispense Refill    apixaban 5 mg (74 tabs) tablets,dose pack FOLLOW DIRECTIONS ON PACKAGING. Take 10MG (2 TABLETS) by mouth 2 times a day FOR 7 DAYS, THEN TAKE 5MG (1 TABLET) TWICE DAILY THEREAFTER. 74 tablet 0     No current facility-administered medications for this visit.     Facility-Administered Medications Ordered in Other Visits   Medication Dose Route Frequency Provider Last Rate Last Admin    sodium chloride 0.9 % bolus 500 mL  500 mL intravenous Once WALE Gutierrez-CNP           PAST HISTORY     IMMUNIZATION HISTORY  Immunization History   Administered Date(s) Administered    Moderna SARS-CoV-2 Vaccination 2021, 2021       PHYSICAL EXAM     VITAL SIGNS: There  were no vitals taken for this visit.     PREVIOUS WEIGHTS:  Wt Readings from Last 3 Encounters:   01/27/24 81.6 kg (180 lb)   10/20/23 81.6 kg (180 lb)       Physical Exam  Vitals reviewed.   Constitutional:       General: She is not in acute distress.     Appearance: She is obese.   HENT:      Nose: Nose normal.      Mouth/Throat:      Mouth: Mucous membranes are moist.   Eyes:      General: No scleral icterus.  Cardiovascular:      Rate and Rhythm: Normal rate and regular rhythm.      Pulses: Normal pulses.      Heart sounds: Normal heart sounds.   Pulmonary:      Effort: Pulmonary effort is normal. No respiratory distress.      Breath sounds: Normal breath sounds. No wheezing, rhonchi or rales.   Abdominal:      General: Bowel sounds are normal.      Palpations: Abdomen is soft.   Musculoskeletal:         General: Normal range of motion.      Right lower leg: Edema (2+ non-pitting) present.      Left lower leg: Edema (2+ non-pitting) present.   Skin:     General: Skin is warm and dry.   Neurological:      Mental Status: She is alert and oriented to person, place, and time.         RESULTS/DATA     Pulmonary Function Test Results     No PFT on record    Chest Radiograph     XR chest 1 view 10/20/2023    Narrative  Interpreted By:  Anna Corea,  STUDY:  XR CHEST 1 VIEW;  10/20/2023 4:22 pm    INDICATION:  Signs/Symptoms:kamara; new onset a fib.    COMPARISON:  None.    ACCESSION NUMBER(S):  [id number]    ORDERING CLINICIAN:  ARELY GREEN    FINDINGS:  The heart is normal in size. There are atherosclerotic changes of the  aorta.    There is no consolidation or pleural fluid.    The lungs appear somewhat hyperinflated.    There are degenerative changes of the spine. Sternal wires are  present.    COMPARISON OF FINDING:    Impression  No acute cardiopulmonary disease.    MACRO:  none    Signed by: Anna Corea 10/20/2023 4:29 PM  Dictation workstation:   [id number]      Chest CT Scan     No results found for this  "or any previous visit from the past 365 days.      Echocardiogram & Cardiac Studies     No results found for this or any previous visit from the past 365 days.       Labwork & Pathology   Complete Blood Count  Lab Results   Component Value Date    WBC 6.2 01/27/2024    HGB 12.6 01/27/2024    HCT 38.5 01/27/2024    MCV 93 01/27/2024     01/27/2024       Peripheral Eosinophil Count:   Eosinophils Absolute (x10*3/uL)   Date Value   01/27/2024 0.30   10/20/2023 0.04       Serum Immunoglobulin E:    No results found for: \"IGE\"     Metabolic Parameters  Sodium   Date/Time Value Ref Range Status   01/27/2024 04:09  136 - 145 mmol/L Final     Potassium   Date/Time Value Ref Range Status   01/27/2024 04:09 AM 4.3 3.5 - 5.3 mmol/L Final     Chloride   Date/Time Value Ref Range Status   01/27/2024 04:09  98 - 107 mmol/L Final     Bicarbonate   Date/Time Value Ref Range Status   01/27/2024 04:09 AM 29 21 - 32 mmol/L Final     Anion Gap   Date/Time Value Ref Range Status   01/27/2024 04:09 AM 10 10 - 20 mmol/L Final     Urea Nitrogen   Date/Time Value Ref Range Status   01/27/2024 04:09 AM 22 6 - 23 mg/dL Final     Creatinine   Date/Time Value Ref Range Status   01/27/2024 04:09 AM 0.52 0.50 - 1.05 mg/dL Final     Glucose   Date/Time Value Ref Range Status   01/27/2024 04:09  (H) 74 - 99 mg/dL Final     Calcium   Date/Time Value Ref Range Status   01/27/2024 04:09 AM 9.0 8.6 - 10.3 mg/dL Final     AST   Date/Time Value Ref Range Status   01/27/2024 04:09 AM 27 9 - 39 U/L Final     ALT   Date/Time Value Ref Range Status   01/27/2024 04:09 AM 23 7 - 45 U/L Final     Comment:     Patients treated with Sulfasalazine may generate falsely decreased results for ALT.       Bronchoscopy & Pathology/Cultures       ASSESSMENT/PLAN     Ms. Gao is a 83 year old female (former smoker, quit 1980's, ~5 pack year history) with a PMHx of COVID (10/2023), HTN, L carotid endarterectomy (07/2020), CAD c/b MI s/p CABG x 3 " (2010), hiatal hernia. Here for initial evaluation for shortness of breath.     Problem List and Orders  Problem List Items Addressed This Visit    None  Visit Diagnoses         Codes    Cough, unspecified type    -  Primary R05.9    Relevant Orders    CT chest high resolution    PUGH (dyspnea on exertion)     R06.09    Relevant Medications    inhalational spacing device (Aerochamber MV) inhaler    albuterol 90 mcg/actuation inhaler    Other Relevant Orders    CT chest high resolution    Complete Pulmonary Function Test Pre/Post Bronchodialator (Spirometry Pre/Post/DLCO/Lung Volumes)    Pulmonary Stress Test (6 Min. Walk)            Assessment and Plan / Recommendations:  # PUGH & dry cough - started 10/2023 and has progressively gotten worse - had COVID c/b a-fib and was placed on amiodarone; prior to this she was active, riding exercise bike and walking without SOB; has associated dry, non-productive cough; started on Breztri and did not notice improvement (however was not using correctly, was holding her breath prior to spraying inhaler)  - ordered HRCT to r/o amiodarone toxicity  - ordered PFT/6MW  -continue Breztri (ordered spacer for use and educated proper way to use inhaler)  -start PRN albtuerol        RTC 2 weeks after PFT & CT

## 2024-04-18 ENCOUNTER — HOSPITAL ENCOUNTER (OUTPATIENT)
Dept: RADIOLOGY | Facility: HOSPITAL | Age: 84
Discharge: HOME | End: 2024-04-18
Payer: MEDICARE

## 2024-04-18 DIAGNOSIS — R05.9 COUGH, UNSPECIFIED TYPE: ICD-10-CM

## 2024-04-18 DIAGNOSIS — R06.09 DOE (DYSPNEA ON EXERTION): ICD-10-CM

## 2024-04-18 PROCEDURE — 71250 CT THORAX DX C-: CPT

## 2024-04-18 PROCEDURE — 71250 CT THORAX DX C-: CPT | Performed by: STUDENT IN AN ORGANIZED HEALTH CARE EDUCATION/TRAINING PROGRAM

## 2024-04-23 ENCOUNTER — TELEPHONE (OUTPATIENT)
Dept: PULMONOLOGY | Facility: HOSPITAL | Age: 84
End: 2024-04-23

## 2024-04-23 DIAGNOSIS — R91.1 INCIDENTAL LUNG NODULE, GREATER THAN OR EQUAL TO 8MM: Primary | ICD-10-CM

## 2024-04-23 NOTE — TELEPHONE ENCOUNTER
Tried to call pt at [phone] regarding Ct results, but was unsuccessful. Voicemail left with instructions to return the call at [phone].

## 2024-04-24 ENCOUNTER — TELEPHONE (OUTPATIENT)
Dept: PULMONOLOGY | Facility: HOSPITAL | Age: 84
End: 2024-04-24
Payer: MEDICARE

## 2024-04-24 NOTE — TELEPHONE ENCOUNTER
Spoke with pt regarding her CT results. Per Nhung Rodriguez CNP, CT did not show any issues that would be caused by the amiodarone however there is a RUL opacity that will need a 6 month follow up CT scan. Pt verbalized understanding of results. All questions answered regarding the results. Pt mentioned that she is getting her PFT's on 5/9/24 and her FUV with Nhung Rodriguez CNP is in June. Pt prefers not to wait until June to get results of her breathing tests and is requesting a sooner visit. Pt was rescheduled on 5/15/24 with Nhung Rodriguez CNP. Her June appointment was canceled. Pt confirmed date, time and location.

## 2024-05-09 ENCOUNTER — HOSPITAL ENCOUNTER (OUTPATIENT)
Dept: RESPIRATORY THERAPY | Facility: HOSPITAL | Age: 84
Discharge: HOME | End: 2024-05-09
Payer: MEDICARE

## 2024-05-09 DIAGNOSIS — R06.09 DOE (DYSPNEA ON EXERTION): ICD-10-CM

## 2024-05-09 PROCEDURE — 94618 PULMONARY STRESS TESTING: CPT

## 2024-05-09 PROCEDURE — 94640 AIRWAY INHALATION TREATMENT: CPT

## 2024-05-09 PROCEDURE — 2500000001 HC RX 250 WO HCPCS SELF ADMINISTERED DRUGS (ALT 637 FOR MEDICARE OP): Performed by: NURSE PRACTITIONER

## 2024-05-09 PROCEDURE — 94726 PLETHYSMOGRAPHY LUNG VOLUMES: CPT

## 2024-05-09 RX ORDER — ALBUTEROL SULFATE 90 UG/1
4 AEROSOL, METERED RESPIRATORY (INHALATION) ONCE
Status: COMPLETED | OUTPATIENT
Start: 2024-05-09 | End: 2024-05-09

## 2024-05-09 RX ADMIN — ALBUTEROL SULFATE 4 PUFF: 90 AEROSOL, METERED RESPIRATORY (INHALATION) at 10:34

## 2024-05-09 ASSESSMENT — PAIN - FUNCTIONAL ASSESSMENT: PAIN_FUNCTIONAL_ASSESSMENT: 0-10

## 2024-05-09 ASSESSMENT — PAIN SCALES - GENERAL: PAINLEVEL_OUTOF10: 0 - NO PAIN

## 2024-05-09 NOTE — PROGRESS NOTES
Home O2 Evaluation:    SpO2 on room air at rest:     95%    SpO2 on room air with exertion:     93%    SpO2 on oxygen at rest:     %    SpO2 on oxygen with exertion:     %    SpO2 on 4L/min O2 with exertion:    %    Ambulation distance:     684 ft    Recommended O2 device: RA    Recommended O2 L/min:    Recommended FiO2 %:     Pre/Post HR: 42/63 bpm. SOB noted w/ exertion.    Charley Mireles, RRT

## 2024-05-10 LAB
MGC ASCENT PFT - FEV1 - POST: 1.18
MGC ASCENT PFT - FEV1 - PRE: 0.93
MGC ASCENT PFT - FEV1 - PREDICTED: 1.89
MGC ASCENT PFT - FVC - POST: 2.03
MGC ASCENT PFT - FVC - PRE: 1.47
MGC ASCENT PFT - FVC - PREDICTED: 2.51

## 2024-05-15 ENCOUNTER — OFFICE VISIT (OUTPATIENT)
Dept: PULMONOLOGY | Facility: CLINIC | Age: 84
End: 2024-05-15
Payer: MEDICARE

## 2024-05-15 VITALS
HEIGHT: 64 IN | WEIGHT: 208 LBS | DIASTOLIC BLOOD PRESSURE: 66 MMHG | RESPIRATION RATE: 16 BRPM | SYSTOLIC BLOOD PRESSURE: 114 MMHG | OXYGEN SATURATION: 96 % | TEMPERATURE: 98.7 F | BODY MASS INDEX: 35.51 KG/M2 | HEART RATE: 45 BPM

## 2024-05-15 DIAGNOSIS — R00.1 BRADYCARDIA: ICD-10-CM

## 2024-05-15 DIAGNOSIS — R09.82 POST-NASAL DRIP: ICD-10-CM

## 2024-05-15 DIAGNOSIS — J44.9 CHRONIC OBSTRUCTIVE PULMONARY DISEASE, UNSPECIFIED COPD TYPE (MULTI): Primary | ICD-10-CM

## 2024-05-15 DIAGNOSIS — R06.02 SHORTNESS OF BREATH: ICD-10-CM

## 2024-05-15 DIAGNOSIS — M79.89 LEG SWELLING: ICD-10-CM

## 2024-05-15 DIAGNOSIS — R91.1 PULMONARY NODULE: ICD-10-CM

## 2024-05-15 PROCEDURE — 99214 OFFICE O/P EST MOD 30 MIN: CPT | Performed by: NURSE PRACTITIONER

## 2024-05-15 PROCEDURE — 1159F MED LIST DOCD IN RCRD: CPT | Performed by: NURSE PRACTITIONER

## 2024-05-15 PROCEDURE — 1036F TOBACCO NON-USER: CPT | Performed by: NURSE PRACTITIONER

## 2024-05-15 RX ORDER — IPRATROPIUM BROMIDE AND ALBUTEROL SULFATE 2.5; .5 MG/3ML; MG/3ML
3 SOLUTION RESPIRATORY (INHALATION) EVERY 4 HOURS PRN
Qty: 1620 ML | Refills: 11 | Status: SHIPPED | OUTPATIENT
Start: 2024-05-15

## 2024-05-15 NOTE — PROGRESS NOTES
Patient: Jeane Gao    [id number]  : 1940 -- AGE 83 y.o.    Provider: BARTOLOME Ricks     Location [facility]   Service Date: 5/15/2024       Department of Medicine  Division of Pulmonary, Critical Care, and Sleep Medicine       [facility]  Established Patient Visit Note      HISTORY OF PRESENT ILLNESS     The patient's referring provider is: No ref. provider found    Chief complaint: Jeane Gao is a 83 y.o. female who presents to a [facility] for an evaluation with concerns of Shortness of Breath (Patient is here for follow up visit. Patient states she is getting shortness of breath on exertion states she is feeling the same. Patient states she has dry cough. Patient using rescue inhaler when needed. Patient had PFT and 6 min walk done also had CT scan done. ).       HISTORY OF PRESENT ILLNESS:   Ms. Gao is a 83 year old female (former smoker, quit , ~5 pack year history) with a PMHx of severe COPD (GOLD 3 FEV1 49%, +BD response), COVID (10/2023), HTN, L carotid endarterectomy (2020), CAD c/b MI s/p CABG x 3 (), hiatal hernia, hypothyroidism. Here for folow-up visit for shortness of breath.     I have independently interviewed and examined the patient in the office and reviewed available records.    DATE OF LAST VISIT: 24    Current History    On today's visit, the patient reports that she is still having a difficult time with shortness of breath. She has Breztri and is using every morning but not consistently at night, if she feels her breathing is not too bad she will only use albuterol instead of Breztri at night. She denies cough or sputum. Having some post-nasal drip with throat irritation currently. Denies nasal congestion, wheezing, chest pain/tightness. Denies acid reflux. She is concerned about taking amiodarone because she has been reading about the side effects and is  "worried it is causing some of her shortness of breath - encouraged her to discuss these concerns with cardiology. She has worsening swelling in her legs/ankles and ongoing weight gain. She is on lasix and reports she is taking it daily as prescribed.     Reviewed CT results, advised has RUL nodule that will need repeat CT in 6 months. Advised could be from an inflammatory process but could also be malignancy and next steps will depend on what the next CT shows. She verbalized understanding.    Reviewed PFT & 6MW results - advised based on 6MW she does not need oxygen as her pulse ox stayed about 88%, lowest was 93% with ambulation. PFT showing severe COPD (GOLD 3 - FEV1 49%) with significant BD response. DLCO WNL.       Prior Notes & History     4/4/24: On today's visit, the patient reports \"I am okay as long as I don't do anything\". She had COVID 10/2023 c/b a-fib s/p cardioversion and since then has had shortness of breath, feels it started more so after starting amiodarone. She has been on Breztri for a little over a month but does not notice much improvement in her breathing.  She has occasional dry, non-productive cough. She denies wheezing, nasal congestion, post-nasal drip, or chest pain.  She has occasional heart burn and occasionally has issues swallowing and feels is related to her hiatal hernia, has not seen general surgeon for hernia.  She has significant SOB when walking even short distances, she is unable to ride her exercise bike anymore.  She has worsening BLE edema and 10 lb weight gain over last month. Denies orthopnea.      Sleep: denies snoring, apnea, daytime drowsiness. Never had sleep study     Denies premature birth. No childhood pulmonary issues growing up. No pulmonary hospitalizations. Has never been on home oxygen therapy before.      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19)  -Exposures: +2nd hand smoke exposure, 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; Denies known exposures to asbestos, silica, beryllium, molds or dusts    REVIEW OF SYSTEMS     REVIEW OF SYSTEMS  Review of Systems  10-point ROS complete and negative except as documented in HPI    ALLERGIES AND MEDICATIONS     ALLERGIES  Allergies   Allergen Reactions    Codeine Rash     Dizzy and difficulty breathing     Dizzy and difficulty breathing    Erythromycin Nausea Only and Rash     Dizziness     Dizziness       MEDICATIONS  Current Outpatient Medications   Medication Sig Dispense Refill    albuterol 90 mcg/actuation inhaler Inhale 2 puffs every 4 hours if needed for wheezing or shortness of breath. 18 g 3    amiodarone (Pacerone) 200 mg tablet Take 1 tablet (200 mg) by mouth 2 times a day.      apixaban 5 mg (74 tabs) tablets,dose pack FOLLOW DIRECTIONS ON PACKAGING. Take 10MG (2 TABLETS) by mouth 2 times a day FOR 7 DAYS, THEN TAKE 5MG (1 TABLET) TWICE DAILY THEREAFTER. 74 tablet 0    aspirin 81 mg EC tablet Take 1 tablet (81 mg) by mouth once daily.      atorvastatin (Lipitor) 80 mg tablet Take 1 tablet (80 mg) by mouth once daily in the morning. Take before meals.      Breztri Aerosphere 160-9-4.8 mcg/actuation HFA aerosol inhaler Inhale 2 puffs 2 times a day.      clopidogrel (Plavix) 75 mg tablet Take 1 tablet (75 mg) by mouth once daily.      cyanocobalamin (Vitamin B-12) 1,000 mcg/mL injection INJECT 1 ML INTO THE MUSCLE ONCE MONTHLY      ezetimibe (Zetia) 10 mg tablet Take 1 tablet (10 mg) by  "mouth once daily.      furosemide (Lasix) 40 mg tablet Take 1 tablet (40 mg) by mouth once daily.      inhalational spacing device (Aerochamber MV) inhaler Use as instructed 1 each 1    isosorbide mononitrate ER (Imdur) 30 mg 24 hr tablet       levothyroxine (Synthroid, Levoxyl) 137 mcg tablet Take 1 tablet (137 mcg) by mouth once daily.      losartan (Cozaar) 50 mg tablet Take 1 tablet (50 mg) by mouth once daily.      metoprolol succinate XL (Toprol-XL) 100 mg 24 hr tablet        No current facility-administered medications for this visit.     Facility-Administered Medications Ordered in Other Visits   Medication Dose Route Frequency Provider Last Rate Last Admin    sodium chloride 0.9 % bolus 500 mL  500 mL intravenous Once Ioana Zuniga, APRN-CNP           PAST HISTORY     PAST MEDICAL HISTORY  She  has no past medical history on file.    PAST SURGICAL HISTORY  History reviewed. No pertinent surgical history.    IMMUNIZATION HISTORY  Immunization History   Administered Date(s) Administered    Moderna SARS-CoV-2 Vaccination 03/08/2021, 04/07/2021       PHYSICAL EXAM     VITAL SIGNS: /66   Pulse (!) 45   Temp 37.1 °C (98.7 °F)   Resp 16   Ht 1.626 m (5' 4\")   Wt 94.3 kg (208 lb)   SpO2 96%   BMI 35.70 kg/m²      PREVIOUS WEIGHTS:  Wt Readings from Last 3 Encounters:   05/15/24 94.3 kg (208 lb)   04/04/24 94 kg (207 lb 3.2 oz)   01/27/24 81.6 kg (180 lb)       Physical Exam  Vitals reviewed.   Constitutional:       General: She is not in acute distress.     Appearance: She is not ill-appearing.   HENT:      Mouth/Throat:      Comments: Hoarse voice with throat clearing at times   Eyes:      General: No scleral icterus.  Cardiovascular:      Rate and Rhythm: Regular rhythm. Bradycardia present.   Pulmonary:      Effort: Pulmonary effort is normal. No respiratory distress.      Breath sounds: Normal breath sounds. No wheezing, rhonchi or rales.   Musculoskeletal:      Right lower leg: Edema (2+ " feet, ankles and legs up to knees) present.      Left lower leg: Edema (2+ feet, ankles and legs up to knees) present.   Skin:     General: Skin is warm and dry.   Neurological:      Mental Status: She is alert and oriented to person, place, and time.         RESULTS/DATA     Pulmonary Function Test Results     6MW 5/9/24:  The patient underwent a 6-minute walk. The patient ambulated for total of 684 feet. The resting room air saturation was 95%. The lowest recorded ambulatory sat on room air was 93%. There is no significant desaturation while ambulating on room air.     PFT 5/9/24:  Impression: 1.  Severe airflow obstruction with response to an aerosolized bronchodilator. 2.  Normal total lung capacity with evidence of early airway closure and gas trapping 3.  Diffusion capacity within the normal limits    Chest Radiograph     XR chest 1 view 10/20/2023    Narrative  Interpreted By:  Anna Corea,  STUDY:  XR CHEST 1 VIEW;  10/20/2023 4:22 pm    INDICATION:  Signs/Symptoms:kamara; new onset a fib.    COMPARISON:  None.    ACCESSION NUMBER(S):  [id number]    ORDERING CLINICIAN:  ARELY GRENE    FINDINGS:  The heart is normal in size. There are atherosclerotic changes of the  aorta.    There is no consolidation or pleural fluid.    The lungs appear somewhat hyperinflated.    There are degenerative changes of the spine. Sternal wires are  present.    COMPARISON OF FINDING:    Impression  No acute cardiopulmonary disease.    MACRO:  none    Signed by: Anna Corea 10/20/2023 4:29 PM  Dictation workstation:   [id number]      Chest CT Scan     CT chest high resolution 04/18/2024    Narrative  Interpreted By:  Georges Lawson,  and Dong Vogel  STUDY:  CT CHEST HIGH RESOLUTION;  4/18/2024 5:52 pm    INDICATION:  Signs/Symptoms:r/o amiodarone toxicity.    Per EMR: 83-year-old female with history of CAD status post CABG, and  atrial fibrillation on amiodarone presenting with increasing  shortness of breath and decreased  functional activity.    COMPARISON:  Chest x-ray 10/20/2023    ACCESSION NUMBER(S):  [id number]    ORDERING CLINICIAN:  RODGER HAMMOND    TECHNIQUE:  Using helical multi-detector technique, volumetric data acquisition  of the chest was obtained without intravenous administration of  contrast material under the high resolution chest CT protocol.  Examination includes contiguous slices through the chest in supine  positioning during inspiration, noncontiguous axial slices in supine  positioning during expiration and prone positioning during  inspiration.    FINDINGS:  LUNGS AND AIRWAYS:  The trachea and central airways are patent. No endobronchial lesion  is seen.    There is a ground-glass 1.4 x 0.9 cm peribronchial apical right upper  lobe nodule with internal cystic lucencies (series 6, image 71).  Additional few sub 5 mm solid nodules seen within remainder of  bilateral lungs, likely benign. Otherwise, the bilateral lungs are  otherwise clear without evidence of focal consolidation, pleural  effusion, or pneumothorax. Expiratory imaging demonstrates no  evidence of air-trapping. Prone imaging reveals improvement of  bibasilar atelectasis and no evidence of pulmonary fibrosis.    MEDIASTINUM AND CALOS, LOWER NECK AND AXILLA:  The visualized thyroid gland is within normal limits.  There are multiple prominent to borderline enlarged intrathoracic  lymph nodes seen, measuring up to1.0 cm, which are nonspecific, but  are felt to be likely reactive in nature. There is a smallsized  hiatal hernia. Otherwise, the esophagus is within normal limits.    HEART AND VESSELS:  The thoracic aorta is normal in course and caliber.There is moderate  calcified atherosclerosis present. The pulmonary artery is mildly  dilated, measuring 3.5 cm at the trunk. There is a normal ratio of  arterial caliber between the pulmonary artery and the thoracic aorta.  Severe coronary artery calcifications are seen. Please note,the study  is not  optimized for evaluation of coronary arteries.Status post  coronary artery bypass grafting (CABG) with bypass graft patency  could not be evaluated on present non contrast study. The mild  cardiomegaly with multichamber enlargement, most significantly  involving the right atrium..There are prominent aortic and mitral  annular calcifications seen. Correlate with aortic stenosis,  cardiovascular risk factors and patient's symptoms. There is no  pericardial effusion seen.    UPPER ABDOMEN:  There is relative hyperdensity of the liver which may be secondary to  amiodarone deposition. There left renal lesion, measuring2.1 cm,  demonstrating homogeneous attenuation and thin wall with no internal  septation/calcification or mural nodule. Although, not fully  characterized, the internal low attenuation of -10 to 20 HU, favors  underlying benign etiology/simple cyst. (Hernusrat BR, Fili SG,  Dmitri NM, et al. Management of the Incidental Renal Mass on CT: A  White Paper of the ACR Incidental Findings Committee. J Am Jaqui  Radiol. 2018;15(2):264-273.)    CHEST WALL AND OSSEOUS STRUCTURES:  The patient is status post median sternotomy. Otherwise, the chest  wall is within normal limits. No acute osseous pathology.There are no  suspicious osseous lesions.Degenerative changes of visualized spine  as well as bilateral shoulders.    Impression  1. A 1.4 cm right upper lobe ground-glass pulmonary nodule with  internal lucencies. Given the morphology of this nodule, underlying  primary lung malignancy is not excluded and attention on follow-up CT  scan within 6-12 months is recommended.  2. No acute cardiopulmonary pathology. No evidence of interstitial  lung disease.  3. Cardiomegaly and pulmonary artery enlargement. Consider  correlation with echocardiography.  4. Additional findings as above.    I personally reviewed the images/study and I agree with the resident  findings as stated by Lela Umana MD. This study was  "interpreted at  [facility], [place], [place].    MACRO:  Incidental Finding:   A ground glass pulmonary nodule measuring 6 mm  or larger.  (**-YCF-**)    Instructions:  Consider follow up non contrast chest CT at 6-12  months to confirm persistence, then consider CT chest every 2 years  until 5 years. (Carlitos Yo et al., Guidelines for management of  incidental pulmonary nodules detected on CT images: From the  Fleischner Society 2017, Radiology. 2017 Jul;284 (1):228-243.)  [email or web address]    Signed by: Georges Lawson 4/19/2024 10:00 AM  Dictation workstation:   [id number]      Echocardiogram & Cardiac Studies     No results found for this or any previous visit from the past 365 days.       Labwork & Pathology   Complete Blood Count  Lab Results   Component Value Date    WBC 6.2 01/27/2024    HGB 12.6 01/27/2024    HCT 38.5 01/27/2024    MCV 93 01/27/2024     01/27/2024       Peripheral Eosinophil Count:   Eosinophils Absolute (x10*3/uL)   Date Value   01/27/2024 0.30   10/20/2023 0.04       Serum Immunoglobulin E:    No results found for: \"IGE\"     Metabolic Parameters  Sodium   Date/Time Value Ref Range Status   01/27/2024 04:09  136 - 145 mmol/L Final     Potassium   Date/Time Value Ref Range Status   01/27/2024 04:09 AM 4.3 3.5 - 5.3 mmol/L Final     Chloride   Date/Time Value Ref Range Status   01/27/2024 04:09  98 - 107 mmol/L Final     Bicarbonate   Date/Time Value Ref Range Status   01/27/2024 04:09 AM 29 21 - 32 mmol/L Final     Anion Gap   Date/Time Value Ref Range Status   01/27/2024 04:09 AM 10 10 - 20 mmol/L Final     Urea Nitrogen   Date/Time Value Ref Range Status   01/27/2024 04:09 AM 22 6 - 23 mg/dL Final     Creatinine   Date/Time Value Ref Range Status   01/27/2024 04:09 AM 0.52 0.50 - 1.05 mg/dL Final     Glucose   Date/Time Value Ref Range Status   01/27/2024 04:09  (H) 74 - 99 mg/dL Final     Calcium   Date/Time Value Ref Range " Status   01/27/2024 04:09 AM 9.0 8.6 - 10.3 mg/dL Final     AST   Date/Time Value Ref Range Status   01/27/2024 04:09 AM 27 9 - 39 U/L Final     ALT   Date/Time Value Ref Range Status   01/27/2024 04:09 AM 23 7 - 45 U/L Final     Comment:     Patients treated with Sulfasalazine may generate falsely decreased results for ALT.         ASSESSMENT/PLAN   Ms. Gao is a 83 year old female (former smoker, quit 1980's, ~5 pack year history) with a PMHx of severe COPD (GOLD 3 FEV1 49%, +BD response), COVID (10/2023), HTN, L carotid endarterectomy (07/2020), CAD c/b MI s/p CABG x 3 (2010), hiatal hernia, hypothyroidism. Here for folow-up visit for shortness of breath.     Problem List and Orders  Problem List Items Addressed This Visit    None  Visit Diagnoses         Codes    Chronic obstructive pulmonary disease, unspecified COPD type (Multi)    -  Primary J44.9    Relevant Medications    ipratropium-albuteroL (Duo-Neb) 0.5-2.5 mg/3 mL nebulizer solution    Other Relevant Orders    Home nebulizer    Shortness of breath     R06.02    Relevant Orders    Transthoracic Echo Complete    Leg swelling     M79.89    Relevant Orders    Transthoracic Echo Complete            Assessment and Plan / Recommendations:  1) Severe COPD (GOLD 3, FEV1 49%, +BD response) - DLCO WNL; currently on Breztri and PRN albuterol, not using Breztri consistently at night but is using every morning; no acute exacerbation, no wheezing   - advised to use Breztri consistently 2 puffs twice daily   - ordered nebulizer machine & duonbebs  - duonebs or albuteorl MDI q4-6hr PRN SOB  - if not improved and no cardiac findings on echo that may explain SOB, may consider changing to Trelegy at next appt to see if has better symptom management     2) Shortness of breath - progressive, having SOB at rest and with very minimal exertion; having worsening leg swelling; HRCT without acute cardiopulmonary pathology and no evidence of interstitial lung disease or pulmonary  fibrosis  - ordered stat echo    3) Bradycardia - HR in 40's during visit, patient denies dizziness, only having SOB; also HRCT noted severe coronary artery calcifications, cardiomegaly and pulmonary artery enlargement with recommendation for echo   - has follow-up with cardiology next week; advised to call PCP or cardiology ASAP to discuss if medications need adjusted even prior to cardiology appt    4) Pulmonary nodule - 1.4 cm RUL ground-glass nodule with internal lucencies, malignancy not excluded and recomment repeat in 6-12 months; inflammation vs malignancy  - repeat CT chest ordered, advised to schedule to be completed around 10/18/24 (6 months from initial CT), she verbalized understanding     5) Post-nasal drip - causing throat clearing and sore throat  -start claritin once daily       RTC 6-8 weeks

## 2024-05-15 NOTE — PATIENT INSTRUCTIONS
"Ms. Jeane Gao    It was a pleasure to see you in clinic today. We discussed your shortness of breath, COPD and CT results.    Based on our conversation, here are my recommendations:   - Continue your Breztri 2 puffs twice daily (RINSE MOUTH & SPIT AFTER USE) -- please start using spacer with your inhaler  - Continue Duonebs or albuterol Inhaler 1-2 puffs every 4-6 hours as needed for shortness of breath. You can also take this 10-15 minutes prior to exertional activity to help \"prime\" your lungs.  - I ordered a nebulizer machine for the duonebs.   - Start loratadine (claritin) once daily to see if helps with post-nasal drip.  - Your chest CT showed a nodule in your right lung. I ordered a repeat CT scan, please schedule this to be completed around 10/18/24  - I ordered an echocardiogram, please schedule these.   - Please call your cardiologist or PCP about your heart rate being 47 to determine if any medications need adjusted.     Thank you for visiting the Pulmonary clinic today!   Return to clinic 6-8 weeks or sooner if needed   Nhung Rodriguez CNP  My office number is [phone] - Mena is my  and Louise is my nurse.     [phone] - scheduling    Any test results will be discussed at next visit -- please make sure to make a follow up appt after testing.  "

## 2024-05-22 ENCOUNTER — HOSPITAL ENCOUNTER (OUTPATIENT)
Dept: CARDIOLOGY | Facility: HOSPITAL | Age: 84
Discharge: HOME | End: 2024-05-22
Payer: MEDICARE

## 2024-05-22 DIAGNOSIS — R06.02 SHORTNESS OF BREATH: ICD-10-CM

## 2024-05-22 DIAGNOSIS — M79.89 LEG SWELLING: ICD-10-CM

## 2024-05-22 PROCEDURE — 93306 TTE W/DOPPLER COMPLETE: CPT

## 2024-05-22 PROCEDURE — 93306 TTE W/DOPPLER COMPLETE: CPT | Performed by: INTERNAL MEDICINE

## 2024-05-23 LAB
AORTIC VALVE MEAN GRADIENT: 5.9 MMHG
AORTIC VALVE PEAK VELOCITY: 1.58 M/S
AV PEAK GRADIENT: 10 MMHG
AVA (PEAK VEL): 2.28 CM2
AVA (VTI): 2.39 CM2
EJECTION FRACTION APICAL 4 CHAMBER: 61.1
LEFT ATRIUM VOLUME AREA LENGTH INDEX BSA: 34.1 ML/M2
LEFT VENTRICLE INTERNAL DIMENSION DIASTOLE: 4.75 CM (ref 3.5–6)
LEFT VENTRICULAR OUTFLOW TRACT DIAMETER: 2.02 CM
LV EJECTION FRACTION BIPLANE: 60 %
MITRAL VALVE E/A RATIO: 3.07
MITRAL VALVE E/E' RATIO: 13.1
RIGHT VENTRICLE FREE WALL PEAK S': 9.5 CM/S
RIGHT VENTRICLE PEAK SYSTOLIC PRESSURE: 43.7 MMHG
TRICUSPID ANNULAR PLANE SYSTOLIC EXCURSION: 2.4 CM

## 2024-05-30 ENCOUNTER — TELEPHONE (OUTPATIENT)
Dept: SLEEP MEDICINE | Facility: CLINIC | Age: 84
End: 2024-05-30
Payer: MEDICARE

## 2024-05-30 NOTE — TELEPHONE ENCOUNTER
Called and spoke with patient about results. Patient asked me to fax to DR. Prado office sent on 5/30/24. Patient had acknowledgement and understanding.       ----- Message from BARTOLOME Ricks sent at 5/29/2024  4:46 PM EDT -----  Please call Ms. Gao and let her know that her echocardiogram shows her heart is pumping effectively. Her left atrium is slightly enlarged, she should follow up with her cardiologist.

## 2024-06-06 ENCOUNTER — APPOINTMENT (OUTPATIENT)
Dept: PULMONOLOGY | Facility: HOSPITAL | Age: 84
End: 2024-06-06
Payer: MEDICARE

## 2024-07-08 ENCOUNTER — OFFICE VISIT (OUTPATIENT)
Dept: PULMONOLOGY | Facility: CLINIC | Age: 84
End: 2024-07-08
Payer: MEDICARE

## 2024-07-08 VITALS
TEMPERATURE: 98.4 F | OXYGEN SATURATION: 96 % | HEIGHT: 64 IN | DIASTOLIC BLOOD PRESSURE: 53 MMHG | SYSTOLIC BLOOD PRESSURE: 144 MMHG | HEART RATE: 50 BPM | RESPIRATION RATE: 16 BRPM | BODY MASS INDEX: 35.51 KG/M2 | WEIGHT: 208 LBS

## 2024-07-08 DIAGNOSIS — R91.1 PULMONARY NODULE: ICD-10-CM

## 2024-07-08 DIAGNOSIS — R06.09 DOE (DYSPNEA ON EXERTION): ICD-10-CM

## 2024-07-08 DIAGNOSIS — J44.9 CHRONIC OBSTRUCTIVE PULMONARY DISEASE, UNSPECIFIED COPD TYPE (MULTI): Primary | ICD-10-CM

## 2024-07-08 PROCEDURE — 99214 OFFICE O/P EST MOD 30 MIN: CPT | Performed by: NURSE PRACTITIONER

## 2024-07-08 PROCEDURE — 1159F MED LIST DOCD IN RCRD: CPT | Performed by: NURSE PRACTITIONER

## 2024-07-08 PROCEDURE — 1036F TOBACCO NON-USER: CPT | Performed by: NURSE PRACTITIONER

## 2024-07-08 NOTE — PROGRESS NOTES
Patient: Jeane Gao    [id number]  : 1940 -- AGE 83 y.o.    Provider: BARTOLOME Ricks     Location [facility]   Service Date: 2024       Department of Medicine  Division of Pulmonary, Critical Care, and Sleep Medicine       [facility]  Established Patient Visit Note      HISTORY OF PRESENT ILLNESS     The patient's referring provider is: No ref. provider found    Chief complaint: Jeane Gao is a 83 y.o. female who presents to a [facility] for an evaluation with concerns of Shortness of Breath (Patient is here for follow up visit. Patient  states her inhaler are working well. Patient states she has occasional dry cough. Patient is using 3x daily. ).       HISTORY OF PRESENT ILLNESS:  Ms. Gao is a 83 year old female (former smoker, quit , ~5 pack year history) with a PMHx of severe COPD (GOLD 3 FEV1 49%, +BD response), COVID (10/2023), HTN, L carotid endarterectomy (2020), CAD c/b MI s/p CABG x 3 (), hiatal hernia, hypothyroidism. Here for folow-up visit for COPD.    I have independently interviewed and examined the patient in the office and reviewed available records.    DATE OF LAST VISIT: 5/15/24    Current History    On today's visit, the patient reports she has been using her Breztri BID as recommended and her breathing is somewhat improved. She is still having PUGH with strenuous activity and does not feel she can be as active as she was in the past. She is using Breztri BID and was using duonebs TID because she thought she was supposed to be using it scheduled TID. Had occasional cough, non-productive but feels she has sputum she is unable to expectorate; she states she was given prescription for mucinex by other provider but did not start because she was afraid to use it. Denies wheezing, nasal congestion, post-nasal drip, chest pain/tightness.     Denies recent visits to  "[facility], ER, PCP.    Prior Notes & History     5/15/24: On today's visit, the patient reports that she is still having a difficult time with shortness of breath. She has Breztri and is using every morning but not consistently at night, if she feels her breathing is not too bad she will only use albuterol instead of Breztri at night. She denies cough or sputum. Having some post-nasal drip with throat irritation currently. Denies nasal congestion, wheezing, chest pain/tightness. Denies acid reflux. She is concerned about taking amiodarone because she has been reading about the side effects and is worried it is causing some of her shortness of breath - encouraged her to discuss these concerns with cardiology. She has worsening swelling in her legs/ankles and ongoing weight gain. She is on lasix and reports she is taking it daily as prescribed.      Reviewed CT results, advised has RUL nodule that will need repeat CT in 6 months. Advised could be from an inflammatory process but could also be malignancy and next steps will depend on what the next CT shows. She verbalized understanding.     Reviewed PFT & 6MW results - advised based on 6MW she does not need oxygen as her pulse ox stayed about 88%, lowest was 93% with ambulation. PFT showing severe COPD (GOLD 3 - FEV1 49%) with significant BD response. DLCO WNL.     4/4/24: On today's visit, the patient reports \"I am okay as long as I don't do anything\". She had COVID 10/2023 c/b a-fib s/p cardioversion and since then has had shortness of breath, feels it started more so after starting amiodarone. She has been on Breztri for a little over a month but does not notice much improvement in her breathing.  She has occasional dry, non-productive cough. She denies wheezing, nasal congestion, post-nasal drip, or chest pain.  She has occasional heart burn and occasionally has issues swallowing and feels is related to her hiatal hernia, has not seen general surgeon for hernia.  She has " significant SOB when walking even short distances, she is unable to ride her exercise bike anymore.  She has worsening BLE edema and 10 lb weight gain over last month. Denies orthopnea.      Sleep: denies snoring, apnea, daytime drowsiness. Never had sleep study     Denies premature birth. No childhood pulmonary issues growing up. No pulmonary hospitalizations. Has never been on home oxygen therapy before.      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; Denies known exposures to asbestos, silica, beryllium, molds or dusts    REVIEW OF SYSTEMS     REVIEW OF SYSTEMS  Review of Systems  10-point ROS complete and negative except as documented in HPI     ALLERGIES AND MEDICATIONS     ALLERGIES  Allergies   Allergen Reactions    Codeine Rash     Dizzy and difficulty breathing     Dizzy and difficulty breathing    Erythromycin Nausea Only and Rash     Dizziness     Dizziness       MEDICATIONS  Current Outpatient Medications   Medication Sig Dispense Refill    albuterol 90 mcg/actuation inhaler Inhale 2 puffs every 4 hours if needed for wheezing or shortness of breath. 18 g 3    amiodarone (Pacerone) 200 mg tablet Take 1 tablet (200 mg) by mouth 2 times a day.      apixaban 5 mg (74 tabs) tablets,dose pack FOLLOW DIRECTIONS ON PACKAGING. Take 10MG (2 TABLETS) by mouth 2 times a day FOR 7 DAYS, THEN TAKE 5MG (1  "TABLET) TWICE DAILY THEREAFTER. 74 tablet 0    aspirin 81 mg EC tablet Take 1 tablet (81 mg) by mouth once daily.      atorvastatin (Lipitor) 80 mg tablet Take 1 tablet (80 mg) by mouth once daily in the morning. Take before meals.      Breztri Aerosphere 160-9-4.8 mcg/actuation HFA aerosol inhaler Inhale 2 puffs 2 times a day.      clopidogrel (Plavix) 75 mg tablet Take 1 tablet (75 mg) by mouth once daily.      cyanocobalamin (Vitamin B-12) 1,000 mcg/mL injection INJECT 1 ML INTO THE MUSCLE ONCE MONTHLY      ezetimibe (Zetia) 10 mg tablet Take 1 tablet (10 mg) by mouth once daily.      furosemide (Lasix) 40 mg tablet Take 1 tablet (40 mg) by mouth once daily.      inhalational spacing device (Aerochamber MV) inhaler Use as instructed 1 each 1    ipratropium-albuteroL (Duo-Neb) 0.5-2.5 mg/3 mL nebulizer solution Take 3 mL by nebulization every 4 hours if needed for wheezing or shortness of breath. 1620 mL 11    isosorbide mononitrate ER (Imdur) 30 mg 24 hr tablet       levothyroxine (Synthroid, Levoxyl) 137 mcg tablet Take 1 tablet (137 mcg) by mouth once daily.      losartan (Cozaar) 50 mg tablet Take 1 tablet (50 mg) by mouth once daily.      metoprolol succinate XL (Toprol-XL) 100 mg 24 hr tablet        No current facility-administered medications for this visit.     Facility-Administered Medications Ordered in Other Visits   Medication Dose Route Frequency Provider Last Rate Last Admin    sodium chloride 0.9 % bolus 500 mL  500 mL intravenous Once Ioana Zuniga, APRN-CNP             PHYSICAL EXAM     VITAL SIGNS: /53   Pulse 50   Temp 36.9 °C (98.4 °F)   Resp 16   Ht 1.626 m (5' 4\")   Wt 94.3 kg (208 lb)   SpO2 96%   BMI 35.70 kg/m²      PREVIOUS WEIGHTS:  Wt Readings from Last 3 Encounters:   07/08/24 94.3 kg (208 lb)   05/15/24 94.3 kg (208 lb)   04/04/24 94 kg (207 lb 3.2 oz)       Physical Exam  Vitals reviewed.   Constitutional:       General: She is not in acute distress.  HENT:      " Mouth/Throat:      Mouth: Mucous membranes are moist.   Eyes:      General: No scleral icterus.  Cardiovascular:      Rate and Rhythm: Regular rhythm. Bradycardia present.      Pulses: Normal pulses.      Heart sounds: Normal heart sounds.   Pulmonary:      Effort: Pulmonary effort is normal. No respiratory distress.      Breath sounds: No wheezing, rhonchi or rales.      Comments: Slightly diminished bilaterally   Musculoskeletal:      Right lower leg: Edema (1+ ankles/lower leg) present.      Left lower leg: Edema (1+ ankles/lower leg) present.   Skin:     General: Skin is warm and dry.   Neurological:      Mental Status: She is alert and oriented to person, place, and time.   Psychiatric:         Mood and Affect: Mood normal.         Behavior: Behavior normal.         RESULTS/DATA     Pulmonary Function Test Results     6MW 5/9/24:  The patient underwent a 6-minute walk. The patient ambulated for total of 684 feet. The resting room air saturation was 95%. The lowest recorded ambulatory sat on room air was 93%. There is no significant desaturation while ambulating on room air.      PFT 5/9/24:  Impression: 1.  Severe airflow obstruction with response to an aerosolized bronchodilator. 2.  Normal total lung capacity with evidence of early airway closure and gas trapping 3.  Diffusion capacity within the normal limits    Chest Radiograph     XR chest 1 view 10/20/2023    Narrative  Interpreted By:  Anna Corea,  STUDY:  XR CHEST 1 VIEW;  10/20/2023 4:22 pm    INDICATION:  Signs/Symptoms:kamara; new onset a fib.    COMPARISON:  None.    ACCESSION NUMBER(S):  [id number]    ORDERING CLINICIAN:  ARELY GREEN    FINDINGS:  The heart is normal in size. There are atherosclerotic changes of the  aorta.    There is no consolidation or pleural fluid.    The lungs appear somewhat hyperinflated.    There are degenerative changes of the spine. Sternal wires are  present.    COMPARISON OF FINDING:    Impression  No acute  cardiopulmonary disease.    MACRO:  none    Signed by: Anna Corea 10/20/2023 4:29 PM  Dictation workstation:   [id number]      Chest CT Scan     CT chest high resolution 04/18/2024    Narrative  Interpreted By:  Georges Lawson and Ravi Shweta  STUDY:  CT CHEST HIGH RESOLUTION;  4/18/2024 5:52 pm    INDICATION:  Signs/Symptoms:r/o amiodarone toxicity.    Per EMR: 83-year-old female with history of CAD status post CABG, and  atrial fibrillation on amiodarone presenting with increasing  shortness of breath and decreased functional activity.    COMPARISON:  Chest x-ray 10/20/2023    ACCESSION NUMBER(S):  [id number]    ORDERING CLINICIAN:  RODGER HAMMOND    TECHNIQUE:  Using helical multi-detector technique, volumetric data acquisition  of the chest was obtained without intravenous administration of  contrast material under the high resolution chest CT protocol.  Examination includes contiguous slices through the chest in supine  positioning during inspiration, noncontiguous axial slices in supine  positioning during expiration and prone positioning during  inspiration.    FINDINGS:  LUNGS AND AIRWAYS:  The trachea and central airways are patent. No endobronchial lesion  is seen.    There is a ground-glass 1.4 x 0.9 cm peribronchial apical right upper  lobe nodule with internal cystic lucencies (series 6, image 71).  Additional few sub 5 mm solid nodules seen within remainder of  bilateral lungs, likely benign. Otherwise, the bilateral lungs are  otherwise clear without evidence of focal consolidation, pleural  effusion, or pneumothorax. Expiratory imaging demonstrates no  evidence of air-trapping. Prone imaging reveals improvement of  bibasilar atelectasis and no evidence of pulmonary fibrosis.    MEDIASTINUM AND CALOS, LOWER NECK AND AXILLA:  The visualized thyroid gland is within normal limits.  There are multiple prominent to borderline enlarged intrathoracic  lymph nodes seen, measuring up to1.0 cm, which are  nonspecific, but  are felt to be likely reactive in nature. There is a smallsized  hiatal hernia. Otherwise, the esophagus is within normal limits.    HEART AND VESSELS:  The thoracic aorta is normal in course and caliber.There is moderate  calcified atherosclerosis present. The pulmonary artery is mildly  dilated, measuring 3.5 cm at the trunk. There is a normal ratio of  arterial caliber between the pulmonary artery and the thoracic aorta.  Severe coronary artery calcifications are seen. Please note,the study  is not optimized for evaluation of coronary arteries.Status post  coronary artery bypass grafting (CABG) with bypass graft patency  could not be evaluated on present non contrast study. The mild  cardiomegaly with multichamber enlargement, most significantly  involving the right atrium..There are prominent aortic and mitral  annular calcifications seen. Correlate with aortic stenosis,  cardiovascular risk factors and patient's symptoms. There is no  pericardial effusion seen.    UPPER ABDOMEN:  There is relative hyperdensity of the liver which may be secondary to  amiodarone deposition. There left renal lesion, measuring2.1 cm,  demonstrating homogeneous attenuation and thin wall with no internal  septation/calcification or mural nodule. Although, not fully  characterized, the internal low attenuation of -10 to 20 HU, favors  underlying benign etiology/simple cyst. (Herts BR, Fili SG,  Dmitri NM, et al. Management of the Incidental Renal Mass on CT: A  White Paper of the ACR Incidental Findings Committee. J Am Jaqui  Radiol. 2018;15(2):264-273.)    CHEST WALL AND OSSEOUS STRUCTURES:  The patient is status post median sternotomy. Otherwise, the chest  wall is within normal limits. No acute osseous pathology.There are no  suspicious osseous lesions.Degenerative changes of visualized spine  as well as bilateral shoulders.    Impression  1. A 1.4 cm right upper lobe ground-glass pulmonary nodule  with  internal lucencies. Given the morphology of this nodule, underlying  primary lung malignancy is not excluded and attention on follow-up CT  scan within 6-12 months is recommended.  2. No acute cardiopulmonary pathology. No evidence of interstitial  lung disease.  3. Cardiomegaly and pulmonary artery enlargement. Consider  correlation with echocardiography.  4. Additional findings as above.    I personally reviewed the images/study and I agree with the resident  findings as stated by Lela Umana MD. This study was interpreted at  [facility], [place], [place].    MACRO:  Incidental Finding:   A ground glass pulmonary nodule measuring 6 mm  or larger.  (**-YCF-**)    Instructions:  Consider follow up non contrast chest CT at 6-12  months to confirm persistence, then consider CT chest every 2 years  until 5 years. (Carlitos Yo et al., Guidelines for management of  incidental pulmonary nodules detected on CT images: From the  Fleischner Society 2017, Radiology. 2017 Jul;284 (1):228-243.)  [email or web address]    Signed by: Georges Lawson 4/19/2024 10:00 AM  Dictation workstation:   [id number]      Echocardiogram & Cardiac Studies     TTE Complete 5/22/24    PHYSICIAN INTERPRETATION:  Left Ventricle: Left ventricular systolic function is normal, with an estimated ejection fraction of 60-65%. There are no regional wall motion abnormalities. The left ventricular cavity size is normal. Spectral Doppler shows a normal pattern of left ventricular diastolic filling.  Left Atrium: The left atrium is mild to moderately dilated.  Right Ventricle: The right ventricle is normal in size. There is normal right ventricular global systolic function.  Right Atrium: The right atrium is mildly dilated.  Aortic Valve: The aortic valve appears structurally normal. There is minimal aortic valve cusp calcification. There is trivial aortic valve regurgitation. The peak instantaneous gradient of the  "aortic valve is 10.0 mmHg. The mean gradient of the aortic valve is 5.9 mmHg.  Mitral Valve: The mitral valve is normal in structure. There is trace mitral valve regurgitation.  Tricuspid Valve: The tricuspid valve is structurally normal. There is mild tricuspid regurgitation. The Doppler estimated RVSP is mildly elevated at 43.7 mmHg.  Pulmonic Valve: The pulmonic valve is not well visualized. There is no indication of pulmonic valve regurgitation.  Pericardium: There is no pericardial effusion noted.  Aorta: The aortic root is normal.        CONCLUSIONS:   1. Left ventricular systolic function is normal with a 60-65% estimated ejection fraction.   2. The left atrium is mild to moderately dilated.   3. Mildly elevated RVSP    Labwork & Pathology   Complete Blood Count  Lab Results   Component Value Date    WBC 6.2 01/27/2024    HGB 12.6 01/27/2024    HCT 38.5 01/27/2024    MCV 93 01/27/2024     01/27/2024       Peripheral Eosinophil Count:   Eosinophils Absolute (x10*3/uL)   Date Value   01/27/2024 0.30   10/20/2023 0.04       Immunocap IgE  No results found for: \"ICIGE\"      ASSESSMENT/PLAN     Ms. Gao is a 83 year old female (former smoker, quit 1980's, ~5 pack year history) with a PMHx of severe COPD (GOLD 3 FEV1 49%, +BD response), COVID (10/2023), HTN, L carotid endarterectomy (07/2020), CAD c/b MI s/p CABG x 3 (2010), hiatal hernia, hypothyroidism. Here for folow-up visit for COPD.    Problem List and Orders  Problem List Items Addressed This Visit    None  Visit Diagnoses         Codes    Chronic obstructive pulmonary disease, unspecified COPD type (Multi)    -  Primary J44.9    Relevant Orders    Referral to Pulmonary Rehabilitation    Pulmonary nodule     R91.1    PUGH (dyspnea on exertion)     R06.09            Assessment and Plan / Recommendations:  1) Severe COPD (GOLD 3, FEV1 49%, +BD response) - DLCO WNL; currently on Breztri and PRN albuterol, PUGH slightly improved since she started using " Breztri twice daily instead of only once daily   - continue Breztri consistently 2 puffs twice daily   - duonebs or albuteorl MDI q4-6hr PRN SOB  - placed referral to pulmonary rehab       2) Shortness of breath - progressive, improved since started using Breztri BID; HRCT without acute cardiopulmonary pathology and no evidence of interstitial lung disease or pulmonary fibrosis; Echo 5/22/24 EF 60-65%, left atrium mildly-moderately dilated, RVSP mildly elevated (R atrium mildly dilated, R ventricle normal size with normal function)  - ongoing treatment of COPD  - ongoing follow-up with cardiology      3) Bradycardia - ongoing, HR in 40-50's during visits, patient denies dizziness, only having SOB; also HRCT noted severe coronary artery calcifications, cardiomegaly and pulmonary artery enlargement with recommendation for echo   - ongoing follow-up with cardiology      4) Pulmonary nodule - 1.4 cm RUL ground-glass nodule with internal lucencies, malignancy not excluded and recomment repeat in 6-12 months; inflammation vs malignancy  - repeat CT chest ordered, advised to schedule to be completed around 10/18/24 (6 months from initial CT), she verbalized understanding      5) Post-nasal drip - did not start claritin; resolved        RTC ~4 months after repeat CT

## 2024-07-08 NOTE — PATIENT INSTRUCTIONS
"Ms. Jeane Gao    It was a pleasure to see you in clinic today. We discussed your shortness of breath, COPD and CT results.    Based on our conversation, here are my recommendations:   - Continue your Breztri 2 puffs twice daily (RINSE MOUTH & SPIT AFTER USE)   - Continue Duonebs or albuterol Inhaler 1-2 puffs every 4-6 hours as needed for shortness of breath. You can also take this 10-15 minutes prior to exertional activity to help \"prime\" your lungs.   - It is okay to start the mucinex you were given by your PCP  - Your chest CT showed a nodule in your right lung. I ordered a repeat CT scan, please schedule this to be completed around 10/18/24  - I placed an order for pulmonary rehabilitation, please schedule this.      Thank you for visiting the Pulmonary clinic today!   Return to clinic in 4 months after CT chest or sooner if needed   Nhung Rodriguez CNP  My office number is [phone] - Mena is my  and Louise is my nurse.     [phone] - scheduling    Any test results will be discussed at next visit -- please make sure to make a follow up appt after testing.  "

## 2024-07-15 DIAGNOSIS — J44.9 CHRONIC OBSTRUCTIVE PULMONARY DISEASE, UNSPECIFIED COPD TYPE (MULTI): Primary | ICD-10-CM

## 2024-08-05 ENCOUNTER — CLINICAL SUPPORT (OUTPATIENT)
Dept: CARDIAC REHAB | Facility: HOSPITAL | Age: 84
End: 2024-08-05
Payer: MEDICARE

## 2024-08-05 VITALS
SYSTOLIC BLOOD PRESSURE: 124 MMHG | DIASTOLIC BLOOD PRESSURE: 68 MMHG | BODY MASS INDEX: 34.12 KG/M2 | WEIGHT: 204.8 LBS | HEIGHT: 65 IN | OXYGEN SATURATION: 94 %

## 2024-08-05 DIAGNOSIS — J44.9 CHRONIC OBSTRUCTIVE PULMONARY DISEASE, UNSPECIFIED COPD TYPE (MULTI): ICD-10-CM

## 2024-08-05 ASSESSMENT — PATIENT HEALTH QUESTIONNAIRE - PHQ9
9. THOUGHTS THAT YOU WOULD BE BETTER OFF DEAD, OR OF HURTING YOURSELF: NOT AT ALL
SUM OF ALL RESPONSES TO PHQ QUESTIONS 1-9: 10
2. FEELING DOWN, DEPRESSED OR HOPELESS: SEVERAL DAYS
7. TROUBLE CONCENTRATING ON THINGS, SUCH AS READING THE NEWSPAPER OR WATCHING TELEVISION: NOT AT ALL
SUM OF ALL RESPONSES TO PHQ QUESTIONS 1-9: 10
1. LITTLE INTEREST OR PLEASURE IN DOING THINGS: SEVERAL DAYS
3. TROUBLE FALLING OR STAYING ASLEEP OR SLEEPING TOO MUCH: SEVERAL DAYS
5. POOR APPETITE OR OVEREATING: NEARLY EVERY DAY
SUM OF ALL RESPONSES TO PHQ9 QUESTIONS 1 & 2: 2
6. FEELING BAD ABOUT YOURSELF - OR THAT YOU ARE A FAILURE OR HAVE LET YOURSELF OR YOUR FAMILY DOWN: SEVERAL DAYS
4. FEELING TIRED OR HAVING LITTLE ENERGY: NEARLY EVERY DAY
8. MOVING OR SPEAKING SO SLOWLY THAT OTHER PEOPLE COULD HAVE NOTICED. OR THE OPPOSITE, BEING SO FIGETY OR RESTLESS THAT YOU HAVE BEEN MOVING AROUND A LOT MORE THAN USUAL: NOT AT ALL

## 2024-08-05 ASSESSMENT — ENCOUNTER SYMPTOMS
LOSS OF SENSATION IN FEET: 0
DEPRESSION: 1
OCCASIONAL FEELINGS OF UNSTEADINESS: 0

## 2024-08-05 NOTE — PROGRESS NOTES
Pulmonary Rehabilitation Initial Treatment Plan    Name: Jeane Gao  Medical Record Number: [id number]  YOB: 1940  Age: 84 y.o.    Today’s Date: 8/5/2024  Primary Care Physician: Vonnie Londono MD  Referring Physician: Yosi Duarte MD  Program Location: [facility]  Primary Diagnosis:   1. Chronic obstructive pulmonary disease, unspecified COPD type (Multi)  Referral to Pulmonary Rehabilitation         Onset/Date of Diagnosis: 2024    Session #: Initial Assessment, not yet started program.    Falls Risk: Low  Psychosocial Assessment  Pre PHQ-9: 10  Post PHQ-9:   Sent PH-Q 9 to MD if score > 20: No; score < 20    Stress Management:  Pt reported/currently experiencing stress: Yes; Stress; Severity: mild and Anxiety; Severity: mild due to lung disease.  Patient uses stress management skills: Yes   History of: no history of anxiety or depression  Currently seeing a mental health provider: No, states she may speak with her PCP about this.  Social Support: Yes, Whom:Family  Pre CAT score: 14  Post CAT score:   Learning Assessment:  Learning assessment/barriers: None  Preferred learning method: Auditory, Visual, and Reading handout  Barriers: None  Stages of Change:Action    Psychosocial Plan  Goal Status: Initial Assessment; goals not yet started  Psychosocial Goals: Decrease CAT score  Decrease PHQ-9 score.  Learn about living with chronic lung disease.  Learn to use stress management techniques.    Psychosocial Interventions/Education: Educate patient on how to live with chronic lung disease.  Question patient on new or existing stress/anxiety issues at least once every 30 days.    Oxygen Assessment  SpO2 at rest: 94 %  SpO2 with exertion: 90 %    Oxygen Use  Supplemental O2 prescribed: No,   Liters at rest: Room Air  Liters with exertion: Room Air  Hypoxia managed: Yes   Home Pulse Oximeter: Yes     Pre MMRC: 1  Post MMRC:     Oxygen Plan  Goal Status: Initial Assessment; goals not  "yet started  Oxygen Goals: Decrease MMRC score  Learn and use diaphragmatic breathing as needed.  Learn and use pursed lip breathing as needed.  Titrate supplemental O2 as needed to keep SpO2 at 88% or greater.    History of FLORES?: No    Oxygen Education/Interventions: Learning to use pursed lip breathing during exercise to help lessen dyspnea.  SpO2 checks with pulse oximeter at rest and with each modality.  Supplemental O2 as needed.  Titrate supplemental O2 as needed to keep SpO2 at 88% or greater.    Nutrition Assessment:  Hyperlipidemia: Yes     Current Dietary Guidelines:  None  Barriers to dietary change: no    Diabetes Assessment  History of Diabetes: No    Weight Management  Height: 165.1 cm (5' 5\")  Weight: 92.9 kg (204 lb 12.8 oz)  BMI (Calculated): 34.08    Nutrition Plan  Goal Status: Initial Assessment; goals not yet started  Nutrition Goals: Learn about healthy eating habits.  Lose 5-15 lbs. while in program.    Nutrition Interventions/Education: Body weight checked weekly.  Discuss fat screener results and recommendations.    Exercise Assessment  Home Exercise: No  Mode: NA  Frequency: NA  Duration: NA    6 Minute Walk Assessment   6 MWT distance: 590 ft  FiO2 used during test: BRADLEY Ching had a fall due to her shoe \"catching the ground\" during her walk. She states she had no injuries and braced her fall with her hand. No complaints after getting up or at any point after. Offered additional evaluation and patient declined.    Exercise Prescription  Exercise Prescription based on: 6 Minute Walk Test  6MWT: Yes  Pre Test O2 Sat/HR: 94/57  2 Minute O2 Sat/HR: 92/68  4 Minute O2 Sat/HR: 90/76  6 Minute O2 Sat/HR: 90/71  Distance Walked(ft): 590  Christ Dyspnea: 12  Christ PRE: 3  Post Test O2 Sat/HR: 95/60     Frequency:  2 days/week   Mode: Treadmill, Recumbent Cycle, Arm Ergometer, and Scifit Stepper   Duration: 20-40 total aerobic minutes   Intensity: RPE 11-13  Target HR:  Rest +30  MET Level: 2+  Patient " wears supplemental O2: No   Modality Workload METs Duration (minutes)   1 Pre-Exercise      2 Scifit Stepper 1.0   5 :00   3 Arm Ergometer UEE 0W   5 :00   4 Recumbent Bike #1   5 :00   5 Scifit Stepper 1.0   5 :00   6 Post-Exercise        Resistance Training: Yes   Home Exercise Prescription given: To be given prior to discharge from program.    Exercise Plan  Goal Status: Initial Assessment; goals not yet started  Exercise Goals: By discharge:  Increase exercise MET level by 5-10% each week  Increase total exercise duration to 30-45 minutes  Obtain 150 minutes/week of moderate intensity aerobic exercise  Initiate strength training 2-3 days a week  Initiate flexibility training 2-3 days a week  Establish a home exercise program before discharge    Exercise Interventions/Education: SPO2 goal is to add or titrate supplemnetal O2 as necessary at rest/exercise via nasal cannula or mask to prevent <88% SPO2.  Target goal for duration: 30-45 minutes; Increase MET level 5-10% each week or as tolerated.    Physical exercise restrictions: None    Other Core Components/Risk Factor Assessment:  Medication adherence  Current Medications:   Medication Documentation Review Audit       Reviewed by Aliyah Morgan MA (Medical Assistant) on 07/08/24 at 1120      Medication Order Taking? Sig Documenting Provider Last Dose Status   albuterol 90 mcg/actuation inhaler [id number] Yes Inhale 2 puffs every 4 hours if needed for wheezing or shortness of breath. Nhung Rodriguez, APRN-CNP Taking Active   amiodarone (Pacerone) 200 mg tablet [id number] Yes Take 1 tablet (200 mg) by mouth 2 times a day. Historical Provider, MD Taking Active   apixaban 5 mg (74 tabs) tablets,dose pack [id number] Yes FOLLOW DIRECTIONS ON PACKAGING. Take 10MG (2 TABLETS) by mouth 2 times a day FOR 7 DAYS, THEN TAKE 5MG (1 TABLET) TWICE DAILY THEREAFTER. Ioana Zuniga, APRN-CNP Taking Active   aspirin 81 mg EC tablet [id number] Yes Take 1 tablet (81 mg) by  mouth once daily. Historical Provider, MD Taking Active   atorvastatin (Lipitor) 80 mg tablet [id number] Yes Take 1 tablet (80 mg) by mouth once daily in the morning. Take before meals. Historical Provider, MD Taking Active   Breztri Aerosphere 160-9-4.8 mcg/actuation HFA aerosol inhaler [id number] Yes Inhale 2 puffs 2 times a day. Historical MD Irihs Taking Active   clopidogrel (Plavix) 75 mg tablet [id number] Yes Take 1 tablet (75 mg) by mouth once daily. Historical Provider, MD Taking Active   cyanocobalamin (Vitamin B-12) 1,000 mcg/mL injection [id number] Yes INJECT 1 ML INTO THE MUSCLE ONCE MONTHLY Historical Provider, MD Taking Active   ezetimibe (Zetia) 10 mg tablet [id number] Yes Take 1 tablet (10 mg) by mouth once daily. Historical MD Irish Taking Active   furosemide (Lasix) 40 mg tablet [id number] Yes Take 1 tablet (40 mg) by mouth once daily. Historical Provider, MD Taking Active   inhalational spacing device (Aerochamber MV) inhaler [id number] Yes Use as instructed BARTOLOME Ricks Taking Active   ipratropium-albuteroL (Duo-Neb) 0.5-2.5 mg/3 mL nebulizer solution [id number] Yes Take 3 mL by nebulization every 4 hours if needed for wheezing or shortness of breath. BARTOLOME Ricks Taking Active   isosorbide mononitrate ER (Imdur) 30 mg 24 hr tablet [id number] Yes  Historical MD Irish Taking Active   levothyroxine (Synthroid, Levoxyl) 137 mcg tablet [id number] Yes Take 1 tablet (137 mcg) by mouth once daily. Historical Provider, MD Taking Active   losartan (Cozaar) 50 mg tablet [id number] Yes Take 1 tablet (50 mg) by mouth once daily. Historical Provider, MD Taking Active   metoprolol succinate XL (Toprol-XL) 100 mg 24 hr tablet [id number] Yes  Historical Provider, MD Taking Active                            Medication compliance: Yes   Uses pill box/organizer: Yes    Carries medication list: Yes    Uses Inhalers appropriately: Yes     Blood Pressure Management  History of Hypertension:  Yes   Medication Changes: No   Resting BP:  124/68    Heart Failure Management  Hx of Heart Failure: No    Smoking/Tobacco Assessment  Social History     Tobacco Use   Smoking Status Former    Current packs/day: 0.00    Average packs/day: 0.3 packs/day for 10.0 years (2.5 ttl pk-yrs)    Types: Cigarettes    Quit date:     Years since quittin.6   Smokeless Tobacco Never       Other Core Component Plan  Goal Status: Initial Assessment; goals not yet started  Other Core Component Goals: Learn to manage bronchial hygiene  Resting BP < 160/90.  Use all medications properly and as prescribed.  Remain cigarette free.    Frequent Cough?: Non-productive  Educational Assessment:  Pulmonary Knowledge Test:   Intake: 12  Discharge:     Other Core Component Interventions/Education: Ensure patient is compliant with all medications throughout program.  Explain bronchial hygiene techniques.  Pre/post resting BPs at each session.    Individual Patient Goals:  Increase strength and endurance  Be able to walk more  Lose some weight    Goal Status: Initial Assessment; goals not yet started    Staff Comments:    Rehab Staff Signature: Tray Butler, RRT

## 2024-08-13 ENCOUNTER — CLINICAL SUPPORT (OUTPATIENT)
Dept: CARDIAC REHAB | Facility: HOSPITAL | Age: 84
End: 2024-08-13
Payer: MEDICARE

## 2024-08-13 DIAGNOSIS — J44.9 CHRONIC OBSTRUCTIVE PULMONARY DISEASE, UNSPECIFIED COPD TYPE (MULTI): ICD-10-CM

## 2024-08-13 PROCEDURE — 94625 PHY/QHP OP PULM RHB W/O MNTR: CPT | Performed by: INTERNAL MEDICINE

## 2024-08-15 ENCOUNTER — CLINICAL SUPPORT (OUTPATIENT)
Dept: CARDIAC REHAB | Facility: HOSPITAL | Age: 84
End: 2024-08-15
Payer: MEDICARE

## 2024-08-15 DIAGNOSIS — J44.9 CHRONIC OBSTRUCTIVE PULMONARY DISEASE, UNSPECIFIED COPD TYPE (MULTI): ICD-10-CM

## 2024-08-15 PROCEDURE — 94625 PHY/QHP OP PULM RHB W/O MNTR: CPT | Performed by: INTERNAL MEDICINE

## 2024-08-20 ENCOUNTER — CLINICAL SUPPORT (OUTPATIENT)
Dept: CARDIAC REHAB | Facility: HOSPITAL | Age: 84
End: 2024-08-20
Payer: MEDICARE

## 2024-08-20 DIAGNOSIS — J44.9 CHRONIC OBSTRUCTIVE PULMONARY DISEASE, UNSPECIFIED COPD TYPE (MULTI): ICD-10-CM

## 2024-08-20 PROCEDURE — 94625 PHY/QHP OP PULM RHB W/O MNTR: CPT | Performed by: INTERNAL MEDICINE

## 2024-08-22 ENCOUNTER — CLINICAL SUPPORT (OUTPATIENT)
Dept: CARDIAC REHAB | Facility: HOSPITAL | Age: 84
End: 2024-08-22
Payer: MEDICARE

## 2024-08-22 DIAGNOSIS — J44.9 CHRONIC OBSTRUCTIVE PULMONARY DISEASE, UNSPECIFIED COPD TYPE (MULTI): ICD-10-CM

## 2024-08-22 PROCEDURE — 94625 PHY/QHP OP PULM RHB W/O MNTR: CPT | Performed by: INTERNAL MEDICINE

## 2024-08-27 ENCOUNTER — APPOINTMENT (OUTPATIENT)
Dept: CARDIAC REHAB | Facility: HOSPITAL | Age: 84
End: 2024-08-27
Payer: MEDICARE

## 2024-08-29 ENCOUNTER — CLINICAL SUPPORT (OUTPATIENT)
Dept: CARDIAC REHAB | Facility: HOSPITAL | Age: 84
End: 2024-08-29
Payer: MEDICARE

## 2024-08-29 DIAGNOSIS — J44.9 CHRONIC OBSTRUCTIVE PULMONARY DISEASE, UNSPECIFIED COPD TYPE (MULTI): ICD-10-CM

## 2024-08-29 PROCEDURE — 94625 PHY/QHP OP PULM RHB W/O MNTR: CPT | Performed by: INTERNAL MEDICINE

## 2024-09-03 ENCOUNTER — APPOINTMENT (OUTPATIENT)
Dept: CARDIAC REHAB | Facility: HOSPITAL | Age: 84
End: 2024-09-03
Payer: MEDICARE

## 2024-09-05 ENCOUNTER — CLINICAL SUPPORT (OUTPATIENT)
Dept: CARDIAC REHAB | Facility: HOSPITAL | Age: 84
End: 2024-09-05
Payer: MEDICARE

## 2024-09-05 DIAGNOSIS — J44.9 CHRONIC OBSTRUCTIVE PULMONARY DISEASE, UNSPECIFIED COPD TYPE (MULTI): ICD-10-CM

## 2024-09-05 PROCEDURE — 94625 PHY/QHP OP PULM RHB W/O MNTR: CPT | Performed by: INTERNAL MEDICINE

## 2024-09-05 NOTE — PROGRESS NOTES
Pulmonary Rehabilitation  30 Day Reevaluation    Name: Jeane Gao  Medical Record Number: [id number]  YOB: 1940  Age: 84 y.o.    Today’s Date: 9/5/2024  Primary Care Physician: Vonnie Londono MD  Referring Physician: Yosi Duarte MD  Program Location: [facility]  Primary Diagnosis:   1. Chronic obstructive pulmonary disease, unspecified COPD type (Multi)  Follow Up In Pulmonary Rehabilitation         Onset/Date of Diagnosis: 2024    Session #: 6    Falls Risk: Low  Psychosocial Assessment  Pre PHQ-9: 10  Post PHQ-9:   Sent PH-Q 9 to MD if score > 20: No; score < 20    Stress Management:  Pt reported/currently experiencing stress: Yes; Stress; Severity: mild and Anxiety; Severity: mild due to lung disease.  Patient uses stress management skills: Yes   History of: no history of anxiety or depression  Currently seeing a mental health provider: No, states she may speak with her PCP about this.  Social Support: Yes, Whom:Family  Pre CAT score: 14  Post CAT score:   Learning Assessment:  Learning assessment/barriers: None  Preferred learning method: Auditory, Visual, and Reading handout  Barriers: None  Stages of Change:Action    Psychosocial Plan  Goal Status: In Progress  Psychosocial Goals: Decrease CAT score  Decrease PHQ-9 score.  Learn about living with chronic lung disease.  Learn to use stress management techniques.    Psychosocial Interventions/Education: Educate patient on how to live with chronic lung disease.  Question patient on new or existing stress/anxiety issues at least once every 30 days.  30 Day Reassessment: Patient reports no new stress, anxiety or depression at this time.    Oxygen Assessment  SpO2 at rest: 95 %  SpO2 with exertion: 89 %    Oxygen Use  Supplemental O2 prescribed: No,   Liters at rest: Room Air  Liters with exertion: Room Air  Hypoxia managed: Yes   Home Pulse Oximeter: Yes     Pre MMRC: 1  Post MMRC:     Oxygen Plan  Goal Status: In  "Progress  Oxygen Goals: Decrease MMRC score  Learn and use diaphragmatic breathing as needed.  Learn and use pursed lip breathing as needed.  Titrate supplemental O2 as needed to keep SpO2 at 88% or greater.    History of FLORES?: No    Oxygen Education/Interventions: Learning to use pursed lip breathing during exercise to help lessen dyspnea.  SpO2 checks with pulse oximeter at rest and with each modality.  Supplemental O2 as needed.  Titrate supplemental O2 as needed to keep SpO2 at 88% or greater.  30 Day Reassessment: Pt educated on diaphragmatic and pursed lip breathing techniques: Yes  Continuing to reinforce proper breathing techniques.  Spo2 of 87 or > maintained on RA with exercise.    Nutrition Assessment:  Hyperlipidemia: Yes     Current Dietary Guidelines:  None  Barriers to dietary change: no    Diabetes Assessment  History of Diabetes: No    Weight Management  Start of rehab weight: 204.8 lbs  Height: 65 in  Weight: 204 lbs  BMI: 33.9      Nutrition Plan  Goal Status: In Progress  Nutrition Goals: Learn about healthy eating habits.  Lose 5-15 lbs. while in program.    Nutrition Interventions/Education: Body weight checked weekly.  Discuss fat screener results and recommendations.  30 Day Reassessment: Weight loss of 0.8 lbs since start of rehab. Jeane states she is improving her diet.    Exercise Assessment  Home Exercise: No  Mode: NA  Frequency: NA  Duration: NA    6 Minute Walk Assessment   6 MWT distance: 590 ft  FiO2 used during test: RA  Jeane had a fall due to her shoe \"catching the ground\" during her walk. She states she had no injuries and braced her fall with her hand. No complaints after getting up or at any point after. Offered additional evaluation and patient declined.    Exercise Prescription  Exercise Prescription based on: 6 Minute Walk Test   Frequency:  2 days/week   Mode: Treadmill, Recumbent Cycle, Arm Ergometer, and Scifit Stepper   Duration: 20-40 total aerobic minutes   Intensity: " RPE 11-13  Target HR:  Rest +30  MET Level: 2+  Patient wears supplemental O2: No   Modality Workload METs Duration (minutes)   1 Pre-Exercise      2 Scifit Stepper 1.8 2.6 9 :00   3 Arm Ergometer UEE 10W   9 :00   4 Recumbent Bike #2 2.6 9 :00   5 Scifit Stepper 1.8 2.6 9 :00   6 Post-Exercise        Resistance Training: Yes   Home Exercise Prescription given: To be given prior to discharge from program.    Exercise Plan  Goal Status: In Progress  Exercise Goals: By discharge:  Increase exercise MET level by 5-10% each week  Increase total exercise duration to 30-45 minutes  Obtain 150 minutes/week of moderate intensity aerobic exercise  Initiate strength training 2-3 days a week  Initiate flexibility training 2-3 days a week  Establish a home exercise program before discharge    Exercise Interventions/Education: SPO2 goal is to add or titrate supplemnetal O2 as necessary at rest/exercise via nasal cannula or mask to prevent <88% SPO2.  Target goal for duration: 30-45 minutes; Increase MET level 5-10% each week or as tolerated.    Physical exercise restrictions: None  30 Day Reassessment: Changes made based on HR and RPE/D responses to exercise.  Received education each week present.  Endurance increased to 36 minutes per session.  Education Classes Completed: Nutrition and Smoking Cessation    Other Core Components/Risk Factor Assessment:  Medication adherence  Current Medications:   Current Outpatient Medications on File Prior to Visit   Medication Sig Dispense Refill    albuterol 90 mcg/actuation inhaler Inhale 2 puffs every 4 hours if needed for wheezing or shortness of breath. 18 g 3    amiodarone (Pacerone) 200 mg tablet Take 1 tablet (200 mg) by mouth 2 times a day.      apixaban 5 mg (74 tabs) tablets,dose pack FOLLOW DIRECTIONS ON PACKAGING. Take 10MG (2 TABLETS) by mouth 2 times a day FOR 7 DAYS, THEN TAKE 5MG (1 TABLET) TWICE DAILY THEREAFTER. 74 tablet 0    aspirin 81 mg EC tablet Take 1 tablet (81 mg)  by mouth once daily.      atorvastatin (Lipitor) 80 mg tablet Take 1 tablet (80 mg) by mouth once daily in the morning. Take before meals.      Breztri Aerosphere 160-9-4.8 mcg/actuation HFA aerosol inhaler Inhale 2 puffs 2 times a day.      clopidogrel (Plavix) 75 mg tablet Take 1 tablet (75 mg) by mouth once daily.      cyanocobalamin (Vitamin B-12) 1,000 mcg/mL injection INJECT 1 ML INTO THE MUSCLE ONCE MONTHLY      ezetimibe (Zetia) 10 mg tablet Take 1 tablet (10 mg) by mouth once daily.      furosemide (Lasix) 40 mg tablet Take 1 tablet (40 mg) by mouth once daily.      inhalational spacing device (Aerochamber MV) inhaler Use as instructed 1 each 1    ipratropium-albuteroL (Duo-Neb) 0.5-2.5 mg/3 mL nebulizer solution Take 3 mL by nebulization every 4 hours if needed for wheezing or shortness of breath. 1620 mL 11    isosorbide mononitrate ER (Imdur) 30 mg 24 hr tablet       levothyroxine (Synthroid, Levoxyl) 137 mcg tablet Take 1 tablet (137 mcg) by mouth once daily.      losartan (Cozaar) 50 mg tablet Take 1 tablet (50 mg) by mouth once daily.      metoprolol succinate XL (Toprol-XL) 100 mg 24 hr tablet        Current Facility-Administered Medications on File Prior to Visit   Medication Dose Route Frequency Provider Last Rate Last Admin    sodium chloride 0.9 % bolus 500 mL  500 mL intravenous Once Ioana Zuniga, APRN-CNP                   Medication compliance: Yes   Uses pill box/organizer: Yes    Carries medication list: Yes    Uses Inhalers appropriately: Yes     Blood Pressure Management  History of Hypertension: Yes   Medication Changes: No   Resting BP:  145/74    Heart Failure Management  Hx of Heart Failure: No    Smoking/Tobacco Assessment  Social History     Tobacco Use   Smoking Status Former    Current packs/day: 0.00    Average packs/day: 0.3 packs/day for 10.0 years (2.5 ttl pk-yrs)    Types: Cigarettes    Quit date:     Years since quittin.7   Smokeless Tobacco Never       Other  Core Component Plan  Goal Status: In Progress  Other Core Component Goals: Learn to manage bronchial hygiene  Resting BP < 160/90.  Use all medications properly and as prescribed.  Remain cigarette free.    Frequent Cough?: Non-productive  Educational Assessment:  Pulmonary Knowledge Test:   Intake: 12/15  Discharge:     Other Core Component Interventions/Education: Ensure patient is compliant with all medications throughout program.  Explain bronchial hygiene techniques.  Pre/post resting BPs at each session.  30 Day Reassessment: Reviewed effects of exercise on BP: Yes  Reviewed knowledge test: Yes  Remains cigarette free at this time.  BP remains within target goal, compliant with meds. Pt reports no issues at this time.    Individual Patient Goals:  Increase strength and endurance  Be able to walk more  Lose some weight    Goal Status: In Progress    Staff Comments:  30 Day Reassessment: Jeane has done well in pulmonay rehab. She has attended 6 sessions and seems to be motivated to make progress. Currently exercising for 36 minutes and METs are up to a max of 2.6. We will continue to increase duration and resistance as able.    Rehab Staff Signature: Tray Butler, RRT

## 2024-09-10 ENCOUNTER — APPOINTMENT (OUTPATIENT)
Dept: CARDIAC REHAB | Facility: HOSPITAL | Age: 84
End: 2024-09-10
Payer: MEDICARE

## 2024-09-12 ENCOUNTER — CLINICAL SUPPORT (OUTPATIENT)
Dept: CARDIAC REHAB | Facility: HOSPITAL | Age: 84
End: 2024-09-12
Payer: MEDICARE

## 2024-09-12 DIAGNOSIS — J44.9 CHRONIC OBSTRUCTIVE PULMONARY DISEASE, UNSPECIFIED COPD TYPE (MULTI): ICD-10-CM

## 2024-09-12 PROCEDURE — 94625 PHY/QHP OP PULM RHB W/O MNTR: CPT | Performed by: INTERNAL MEDICINE

## 2024-09-17 ENCOUNTER — APPOINTMENT (OUTPATIENT)
Dept: CARDIAC REHAB | Facility: HOSPITAL | Age: 84
End: 2024-09-17
Payer: MEDICARE

## 2024-09-19 ENCOUNTER — APPOINTMENT (OUTPATIENT)
Dept: CARDIAC REHAB | Facility: HOSPITAL | Age: 84
End: 2024-09-19
Payer: MEDICARE

## 2024-09-24 ENCOUNTER — APPOINTMENT (OUTPATIENT)
Dept: CARDIAC REHAB | Facility: HOSPITAL | Age: 84
End: 2024-09-24
Payer: MEDICARE

## 2024-09-24 ENCOUNTER — HOSPITAL ENCOUNTER (OUTPATIENT)
Dept: RADIOLOGY | Facility: HOSPITAL | Age: 84
Discharge: HOME | End: 2024-09-24
Payer: MEDICARE

## 2024-09-24 DIAGNOSIS — M79.672 PAIN IN LEFT FOOT: ICD-10-CM

## 2024-09-24 PROCEDURE — 73630 X-RAY EXAM OF FOOT: CPT | Mod: LEFT SIDE | Performed by: RADIOLOGY

## 2024-09-24 PROCEDURE — 73630 X-RAY EXAM OF FOOT: CPT | Mod: LT

## 2024-09-26 ENCOUNTER — APPOINTMENT (OUTPATIENT)
Dept: CARDIAC REHAB | Facility: HOSPITAL | Age: 84
End: 2024-09-26
Payer: MEDICARE

## 2024-10-01 ENCOUNTER — APPOINTMENT (OUTPATIENT)
Dept: CARDIAC REHAB | Facility: HOSPITAL | Age: 84
End: 2024-10-01
Payer: MEDICARE

## 2024-10-02 ENCOUNTER — DOCUMENTATION (OUTPATIENT)
Dept: CARDIAC REHAB | Facility: HOSPITAL | Age: 84
End: 2024-10-02
Payer: MEDICARE

## 2024-10-02 NOTE — PROGRESS NOTES
Pulmonary Rehabilitation  60 Day Reevaluation    Name: Jeane Gao  Medical Record Number: [id number]  YOB: 1940  Age: 84 y.o.    Today’s Date: 10/2/2024  Primary Care Physician: Vonnie Londono MD  Referring Physician: Yosi Duarte MD  Program Location: [facility]  Primary Diagnosis: COPD J44.9  Onset/Date of Diagnosis: 2024    Session #: 6    Falls Risk: Low  Psychosocial Assessment  Pre PHQ-9: 10  Post PHQ-9:   Sent PH-Q 9 to MD if score > 20: No; score < 20    Stress Management:  Pt reported/currently experiencing stress: Yes; Stress; Severity: mild and Anxiety; Severity: mild due to lung disease.  Patient uses stress management skills: Yes   History of: no history of anxiety or depression  Currently seeing a mental health provider: No, states she may speak with her PCP about this.  Social Support: Yes, Whom:Family  Pre CAT score: 14  Post CAT score:   Learning Assessment:  Learning assessment/barriers: None  Preferred learning method: Auditory, Visual, and Reading handout  Barriers: None  Stages of Change:Action    Psychosocial Plan  Goal Status: In Progress  Psychosocial Goals: Decrease CAT score  Decrease PHQ-9 score.  Learn about living with chronic lung disease.  Learn to use stress management techniques.    Psychosocial Interventions/Education: Educate patient on how to live with chronic lung disease.  Question patient on new or existing stress/anxiety issues at least once every 30 days.  60 Day Reassessment: Patient reports some new stress from injuring foot.    Oxygen Assessment  SpO2 at rest: 96 %  SpO2 with exertion: 89 %    Oxygen Use  Supplemental O2 prescribed: No,   Liters at rest: Room Air  Liters with exertion: Room Air  Hypoxia managed: Yes   Home Pulse Oximeter: Yes     Pre MMRC: 1  Post MMRC:     Oxygen Plan  Goal Status: In Progress  Oxygen Goals: Decrease MMRC score  Learn and use diaphragmatic breathing as needed.  Learn and use pursed lip breathing as  "needed.  Titrate supplemental O2 as needed to keep SpO2 at 88% or greater.    History of FLORES?: No    Oxygen Education/Interventions: Learning to use pursed lip breathing during exercise to help lessen dyspnea.  SpO2 checks with pulse oximeter at rest and with each modality.  Supplemental O2 as needed.  Titrate supplemental O2 as needed to keep SpO2 at 88% or greater.  60 Day Reassessment: Pt educated on diaphragmatic and pursed lip breathing techniques: Yes  Continuing to reinforce proper breathing techniques.  Spo2 of 87 or > maintained on RA with exercise.    Nutrition Assessment:  Hyperlipidemia: Yes     Current Dietary Guidelines:  None  Barriers to dietary change: no    Diabetes Assessment  History of Diabetes: No    Weight Management  Start of rehab weight: 204.8 lbs  Height: 65 in  Weight: 206.1 lbs  BMI: 34.3    Nutrition Plan  Goal Status: In Progress  Nutrition Goals: Learn about healthy eating habits.  Lose 5-15 lbs. while in program.    Nutrition Interventions/Education: Body weight checked weekly.  Discuss fat screener results and recommendations.  60 Day Reassessment: Weight gain of 1.3 lbs since start of rehab. Jeane states she is trying to improve her diet.    Exercise Assessment  Home Exercise: No  Mode: NA  Frequency: NA  Duration: NA    6 Minute Walk Assessment   6 MWT distance: 590 ft  FiO2 used during test: BRADLEY Ching had a fall due to her shoe \"catching the ground\" during her walk. She states she had no injuries and braced her fall with her hand. No complaints after getting up or at any point after. Offered additional evaluation and patient declined.    Exercise Prescription  Exercise Prescription based on: 6 Minute Walk Test   Frequency:  2 days/week   Mode: Treadmill, Recumbent Cycle, Arm Ergometer, and Scifit Stepper   Duration: 20-40 total aerobic minutes   Intensity: RPE 11-13  Target HR:  Rest +30  MET Level: 2+  Patient wears supplemental O2: No   Modality Workload METs Duration (minutes) "   1 Pre-Exercise      2 Scifit Stepper 1.8 2.6 10 :00   3 Arm Ergometer UBE 1.0 1.6 10 :00   4 Recumbent Bike #2 2.6 10 :00   5 Scifit Stepper 1.8 2.6 10 :00   6 Post-Exercise      Resistance Training: Yes   Home Exercise Prescription given: To be given prior to discharge from program.    Exercise Plan  Goal Status: In Progress  Exercise Goals: By discharge:  Increase exercise MET level by 5-10% each week  Increase total exercise duration to 30-45 minutes  Obtain 150 minutes/week of moderate intensity aerobic exercise  Initiate strength training 2-3 days a week  Initiate flexibility training 2-3 days a week  Establish a home exercise program before discharge    Exercise Interventions/Education: SPO2 goal is to add or titrate supplemnetal O2 as necessary at rest/exercise via nasal cannula or mask to prevent <88% SPO2.  Target goal for duration: 30-45 minutes; Increase MET level 5-10% each week or as tolerated.    Physical exercise restrictions: None  60 Day Reassessment: Changes made based on HR and RPE/D responses to exercise.  Received education each week present.  Endurance increased to 36 minutes per session.  Education Classes Completed: Nutrition and Smoking Cessation    Other Core Components/Risk Factor Assessment:  Medication adherence  Current Medications:   Current Outpatient Medications on File Prior to Visit   Medication Sig Dispense Refill    albuterol 90 mcg/actuation inhaler Inhale 2 puffs every 4 hours if needed for wheezing or shortness of breath. 18 g 3    amiodarone (Pacerone) 200 mg tablet Take 1 tablet (200 mg) by mouth 2 times a day.      apixaban 5 mg (74 tabs) tablets,dose pack FOLLOW DIRECTIONS ON PACKAGING. Take 10MG (2 TABLETS) by mouth 2 times a day FOR 7 DAYS, THEN TAKE 5MG (1 TABLET) TWICE DAILY THEREAFTER. 74 tablet 0    aspirin 81 mg EC tablet Take 1 tablet (81 mg) by mouth once daily.      atorvastatin (Lipitor) 80 mg tablet Take 1 tablet (80 mg) by mouth once daily in the morning.  Take before meals.      Breztri Aerosphere 160-9-4.8 mcg/actuation HFA aerosol inhaler Inhale 2 puffs 2 times a day.      clopidogrel (Plavix) 75 mg tablet Take 1 tablet (75 mg) by mouth once daily.      cyanocobalamin (Vitamin B-12) 1,000 mcg/mL injection INJECT 1 ML INTO THE MUSCLE ONCE MONTHLY      ezetimibe (Zetia) 10 mg tablet Take 1 tablet (10 mg) by mouth once daily.      furosemide (Lasix) 40 mg tablet Take 1 tablet (40 mg) by mouth once daily.      inhalational spacing device (Aerochamber MV) inhaler Use as instructed 1 each 1    ipratropium-albuteroL (Duo-Neb) 0.5-2.5 mg/3 mL nebulizer solution Take 3 mL by nebulization every 4 hours if needed for wheezing or shortness of breath. 1620 mL 11    isosorbide mononitrate ER (Imdur) 30 mg 24 hr tablet       levothyroxine (Synthroid, Levoxyl) 137 mcg tablet Take 1 tablet (137 mcg) by mouth once daily.      losartan (Cozaar) 50 mg tablet Take 1 tablet (50 mg) by mouth once daily.      metoprolol succinate XL (Toprol-XL) 100 mg 24 hr tablet        Current Facility-Administered Medications on File Prior to Visit   Medication Dose Route Frequency Provider Last Rate Last Admin    sodium chloride 0.9 % bolus 500 mL  500 mL intravenous Once Ioana Zuniga, APRN-CNP                   Medication compliance: Yes   Uses pill box/organizer: Yes    Carries medication list: Yes    Uses Inhalers appropriately: Yes     Blood Pressure Management  History of Hypertension: Yes   Medication Changes: No   Resting BP:  148/79    Heart Failure Management  Hx of Heart Failure: No    Smoking/Tobacco Assessment  Social History     Tobacco Use   Smoking Status Former    Current packs/day: 0.00    Average packs/day: 0.3 packs/day for 10.0 years (2.5 ttl pk-yrs)    Types: Cigarettes    Quit date:     Years since quittin.7   Smokeless Tobacco Never     Other Core Component Plan  Goal Status: In Progress  Other Core Component Goals: Learn to manage bronchial hygiene  Resting BP <  160/90.  Use all medications properly and as prescribed.  Remain cigarette free.    Frequent Cough?: Non-productive  Educational Assessment:  Pulmonary Knowledge Test:   Intake: 12/15  Discharge:     Other Core Component Interventions/Education: Ensure patient is compliant with all medications throughout program.  Explain bronchial hygiene techniques.  Pre/post resting BPs at each session.  60 Day Reassessment: Reviewed effects of exercise on BP: Yes  Reviewed knowledge test: Yes  Remains cigarette free at this time.  BP remains within target goal, compliant with meds. Pt reports no issues at this time.    Individual Patient Goals:  Increase strength and endurance  Be able to walk more  Lose some weight    Goal Status: In Progress    Staff Comments:  60 Day Reassessment: Jeane has completed 7 sessions of pulmonary rehab. Attendance has been limited due to foot injury. Endurance increased to 40 minutes per session and METs have remained consistent at a max of 2.6. We will continue to increase resistance as able.    Rehab Staff Signature: Tray Butler, RRT

## 2024-10-03 ENCOUNTER — APPOINTMENT (OUTPATIENT)
Dept: CARDIAC REHAB | Facility: HOSPITAL | Age: 84
End: 2024-10-03
Payer: MEDICARE

## 2024-10-08 ENCOUNTER — CLINICAL SUPPORT (OUTPATIENT)
Dept: CARDIAC REHAB | Facility: HOSPITAL | Age: 84
End: 2024-10-08
Payer: MEDICARE

## 2024-10-08 DIAGNOSIS — J44.9 CHRONIC OBSTRUCTIVE PULMONARY DISEASE, UNSPECIFIED COPD TYPE (MULTI): ICD-10-CM

## 2024-10-08 PROCEDURE — 94625 PHY/QHP OP PULM RHB W/O MNTR: CPT | Performed by: INTERNAL MEDICINE

## 2024-10-10 ENCOUNTER — APPOINTMENT (OUTPATIENT)
Dept: CARDIAC REHAB | Facility: HOSPITAL | Age: 84
End: 2024-10-10
Payer: MEDICARE

## 2024-10-10 ENCOUNTER — CLINICAL SUPPORT (OUTPATIENT)
Dept: CARDIAC REHAB | Facility: HOSPITAL | Age: 84
End: 2024-10-10
Payer: MEDICARE

## 2024-10-10 DIAGNOSIS — J44.9 CHRONIC OBSTRUCTIVE PULMONARY DISEASE, UNSPECIFIED COPD TYPE (MULTI): ICD-10-CM

## 2024-10-10 PROCEDURE — 94625 PHY/QHP OP PULM RHB W/O MNTR: CPT | Performed by: INTERNAL MEDICINE

## 2024-10-15 ENCOUNTER — APPOINTMENT (OUTPATIENT)
Dept: CARDIAC REHAB | Facility: HOSPITAL | Age: 84
End: 2024-10-15
Payer: MEDICARE

## 2024-10-17 ENCOUNTER — APPOINTMENT (OUTPATIENT)
Dept: CARDIAC REHAB | Facility: HOSPITAL | Age: 84
End: 2024-10-17
Payer: MEDICARE

## 2024-10-17 DIAGNOSIS — J44.9 CHRONIC OBSTRUCTIVE PULMONARY DISEASE, UNSPECIFIED COPD TYPE (MULTI): ICD-10-CM

## 2024-10-17 PROCEDURE — 94625 PHY/QHP OP PULM RHB W/O MNTR: CPT | Performed by: INTERNAL MEDICINE

## 2024-10-22 ENCOUNTER — APPOINTMENT (OUTPATIENT)
Dept: CARDIAC REHAB | Facility: HOSPITAL | Age: 84
End: 2024-10-22
Payer: MEDICARE

## 2024-10-24 ENCOUNTER — APPOINTMENT (OUTPATIENT)
Dept: CARDIAC REHAB | Facility: HOSPITAL | Age: 84
End: 2024-10-24
Payer: MEDICARE

## 2024-10-24 DIAGNOSIS — J44.9 CHRONIC OBSTRUCTIVE PULMONARY DISEASE, UNSPECIFIED COPD TYPE (MULTI): ICD-10-CM

## 2024-10-24 PROCEDURE — 94625 PHY/QHP OP PULM RHB W/O MNTR: CPT | Performed by: INTERNAL MEDICINE

## 2024-10-29 ENCOUNTER — OFFICE VISIT (OUTPATIENT)
Dept: PULMONOLOGY | Facility: HOSPITAL | Age: 84
End: 2024-10-29
Payer: MEDICARE

## 2024-10-29 ENCOUNTER — OFFICE VISIT (OUTPATIENT)
Dept: VASCULAR SURGERY | Facility: HOSPITAL | Age: 84
End: 2024-10-29
Payer: MEDICARE

## 2024-10-29 ENCOUNTER — APPOINTMENT (OUTPATIENT)
Dept: CARDIAC REHAB | Facility: HOSPITAL | Age: 84
End: 2024-10-29
Payer: MEDICARE

## 2024-10-29 VITALS
WEIGHT: 206 LBS | BODY MASS INDEX: 33.25 KG/M2 | SYSTOLIC BLOOD PRESSURE: 122 MMHG | DIASTOLIC BLOOD PRESSURE: 71 MMHG | HEART RATE: 69 BPM

## 2024-10-29 VITALS
WEIGHT: 208.4 LBS | RESPIRATION RATE: 16 BRPM | DIASTOLIC BLOOD PRESSURE: 71 MMHG | SYSTOLIC BLOOD PRESSURE: 122 MMHG | BODY MASS INDEX: 33.49 KG/M2 | HEIGHT: 66 IN | HEART RATE: 69 BPM | OXYGEN SATURATION: 95 %

## 2024-10-29 DIAGNOSIS — R91.1 PULMONARY NODULE: Primary | ICD-10-CM

## 2024-10-29 DIAGNOSIS — R60.1 GENERALIZED EDEMA: ICD-10-CM

## 2024-10-29 DIAGNOSIS — I73.9 PAD (PERIPHERAL ARTERY DISEASE) (CMS-HCC): ICD-10-CM

## 2024-10-29 DIAGNOSIS — I83.813 VARICOSE VEINS OF BOTH LOWER EXTREMITIES WITH PAIN: Primary | ICD-10-CM

## 2024-10-29 DIAGNOSIS — J44.9 CHRONIC OBSTRUCTIVE PULMONARY DISEASE, UNSPECIFIED COPD TYPE (MULTI): ICD-10-CM

## 2024-10-29 PROCEDURE — 99213 OFFICE O/P EST LOW 20 MIN: CPT | Performed by: NURSE PRACTITIONER

## 2024-10-29 PROCEDURE — 99204 OFFICE O/P NEW MOD 45 MIN: CPT | Performed by: PHYSICIAN ASSISTANT

## 2024-10-29 PROCEDURE — 99214 OFFICE O/P EST MOD 30 MIN: CPT | Performed by: PHYSICIAN ASSISTANT

## 2024-10-29 PROCEDURE — 1159F MED LIST DOCD IN RCRD: CPT | Performed by: PHYSICIAN ASSISTANT

## 2024-10-29 PROCEDURE — 1036F TOBACCO NON-USER: CPT | Performed by: NURSE PRACTITIONER

## 2024-10-29 PROCEDURE — 1159F MED LIST DOCD IN RCRD: CPT | Performed by: NURSE PRACTITIONER

## 2024-10-31 ENCOUNTER — APPOINTMENT (OUTPATIENT)
Dept: CARDIAC REHAB | Facility: HOSPITAL | Age: 84
End: 2024-10-31
Payer: MEDICARE

## 2024-11-02 ASSESSMENT — ENCOUNTER SYMPTOMS
CONSTIPATION: 0
DIARRHEA: 0
NAUSEA: 0
WHEEZING: 0
WOUND: 0
PALPITATIONS: 0
COLOR CHANGE: 0
ABDOMINAL PAIN: 0
LIGHT-HEADEDNESS: 0
COUGH: 0
SORE THROAT: 0
NUMBNESS: 0
TROUBLE SWALLOWING: 0
JOINT SWELLING: 0
CHILLS: 0
DIZZINESS: 0
WEAKNESS: 0
FACIAL ASYMMETRY: 0
ARTHRALGIAS: 0
ADENOPATHY: 0
DIFFICULTY URINATING: 0
FATIGUE: 0
CONFUSION: 0
SLEEP DISTURBANCE: 0
SPEECH DIFFICULTY: 0
HEADACHES: 0
NECK PAIN: 0
SEIZURES: 0
SHORTNESS OF BREATH: 0
VOMITING: 0
FEVER: 0

## 2024-11-05 ENCOUNTER — APPOINTMENT (OUTPATIENT)
Dept: CARDIAC REHAB | Facility: HOSPITAL | Age: 84
End: 2024-11-05
Payer: MEDICARE

## 2024-11-07 ENCOUNTER — APPOINTMENT (OUTPATIENT)
Dept: CARDIAC REHAB | Facility: HOSPITAL | Age: 84
End: 2024-11-07
Payer: MEDICARE

## 2024-11-07 ENCOUNTER — DOCUMENTATION (OUTPATIENT)
Dept: CARDIAC REHAB | Facility: HOSPITAL | Age: 84
End: 2024-11-07

## 2024-11-07 NOTE — PROGRESS NOTES
Pulmonary Rehabilitation  90 Day Reevaluation    Name: Jeane Gao  Medical Record Number: [id number]  YOB: 1940  Age: 84 y.o.    Today’s Date: 11/7/2024  Primary Care Physician: Vonnie Londono MD  Referring Physician: Yosi Duarte MD  Program Location: [facility]  Primary Diagnosis: COPD J44.9  Onset/Date of Diagnosis: 2024    Session #: 11    Falls Risk: Low  Psychosocial Assessment  Pre PHQ-9: 10  Post PHQ-9:   Sent PH-Q 9 to MD if score > 20: No; score < 20    Stress Management:  Pt reported/currently experiencing stress: Yes; Stress; Severity: mild and Anxiety; Severity: mild due to lung disease.  Patient uses stress management skills: Yes   History of: no history of anxiety or depression  Currently seeing a mental health provider: No, states she may speak with her PCP about this.  Social Support: Yes, Whom:Family  Pre CAT score: 14  Post CAT score:   Learning Assessment:  Learning assessment/barriers: None  Preferred learning method: Auditory, Visual, and Reading handout  Barriers: None  Stages of Change:Action    Psychosocial Plan  Goal Status: In Progress  Psychosocial Goals: Decrease CAT score  Decrease PHQ-9 score.  Learn about living with chronic lung disease.  Learn to use stress management techniques.    Psychosocial Interventions/Education: Educate patient on how to live with chronic lung disease.  Question patient on new or existing stress/anxiety issues at least once every 30 days.  90 Day Reassessment: Unable to assess due to poor recent attendance.    Oxygen Assessment  SpO2 at rest: 97 %  SpO2 with exertion: 88 %    Oxygen Use  Supplemental O2 prescribed: No,   Liters at rest: Room Air  Liters with exertion: Room Air  Hypoxia managed: Yes   Home Pulse Oximeter: Yes     Pre MMRC: 1  Post MMRC:     Oxygen Plan  Goal Status: In Progress  Oxygen Goals: Decrease MMRC score  Learn and use diaphragmatic breathing as needed.  Learn and use pursed lip breathing as  "needed.  Titrate supplemental O2 as needed to keep SpO2 at 88% or greater.    History of FLORES?: No    Oxygen Education/Interventions: Learning to use pursed lip breathing during exercise to help lessen dyspnea.  SpO2 checks with pulse oximeter at rest and with each modality.  Supplemental O2 as needed.  Titrate supplemental O2 as needed to keep SpO2 at 88% or greater.  90 Day Reassessment: Pt educated on diaphragmatic and pursed lip breathing techniques: Yes  Continuing to reinforce proper breathing techniques.  Spo2 of 87 or > maintained on RA with exercise.    Nutrition Assessment:  Hyperlipidemia: Yes     Current Dietary Guidelines:  None  Barriers to dietary change: no    Diabetes Assessment  History of Diabetes: No    Weight Management  Start of rehab weight: 204.8 lbs  Height: 65 in  Weight: 206.8 lbs  BMI: 34.4    Nutrition Plan  Goal Status: In Progress  Nutrition Goals: Learn about healthy eating habits.  Lose 5-15 lbs. while in program.    Nutrition Interventions/Education: Body weight checked weekly.  Discuss fat screener results and recommendations.  90 Day Reassessment: Weight gain of 2 lbs since start of rehab. Jeane stated she is trying to improve her diet.    Exercise Assessment  Home Exercise: No  Mode: NA  Frequency: NA  Duration: NA    6 Minute Walk Assessment   6 MWT distance: 590 ft  FiO2 used during test: RA  Jeane had a fall due to her shoe \"catching the ground\" during her walk. She states she had no injuries and braced her fall with her hand. No complaints after getting up or at any point after. Offered additional evaluation and patient declined.    Exercise Prescription  Exercise Prescription based on: 6 Minute Walk Test   Frequency:  2 days/week   Mode: Treadmill, Recumbent Cycle, Arm Ergometer, and Scifit Stepper   Duration: 20-40 total aerobic minutes   Intensity: RPE 11-13  Target HR:  Rest +30  MET Level: 2+  Patient wears supplemental O2: No   Modality Workload METs Duration (minutes) "   1 Pre-Exercise      2 Scifit Stepper 2.5 2.6 10 :00   3 Arm Ergometer UBE 2.0 2.0 10 :00   4 Recumbent Bike #4 2.6 10 :00   5 Treadmill 1.5/0 2.2 5 :00   6 Post-Exercise      Resistance Training: Yes   Home Exercise Prescription given: To be given prior to discharge from program.    Exercise Plan  Goal Status: In Progress  Exercise Goals: By discharge:  Increase exercise MET level by 5-10% each week  Increase total exercise duration to 30-45 minutes  Obtain 150 minutes/week of moderate intensity aerobic exercise  Initiate strength training 2-3 days a week  Initiate flexibility training 2-3 days a week  Establish a home exercise program before discharge    Exercise Interventions/Education: SPO2 goal is to add or titrate supplemnetal O2 as necessary at rest/exercise via nasal cannula or mask to prevent <88% SPO2.  Target goal for duration: 30-45 minutes; Increase MET level 5-10% each week or as tolerated.    Physical exercise restrictions: None  90 Day Reassessment: Changes made based on HR and RPE/D responses to exercise.  Received education each week present.  Endurance of 35 minutes per session, decreased due to starting to use TM.  Education Classes Completed: How to Live with COPD, Nutrition, and Smoking Cessation    Other Core Components/Risk Factor Assessment:  Medication adherence  Current Medications:   Current Outpatient Medications on File Prior to Visit   Medication Sig Dispense Refill    albuterol 90 mcg/actuation inhaler Inhale 2 puffs every 4 hours if needed for wheezing or shortness of breath. 18 g 3    amiodarone (Pacerone) 200 mg tablet Take 1 tablet (200 mg) by mouth 2 times a day.      apixaban 5 mg (74 tabs) tablets,dose pack FOLLOW DIRECTIONS ON PACKAGING. Take 10MG (2 TABLETS) by mouth 2 times a day FOR 7 DAYS, THEN TAKE 5MG (1 TABLET) TWICE DAILY THEREAFTER. 74 tablet 0    aspirin 81 mg EC tablet Take 1 tablet (81 mg) by mouth once daily.      atorvastatin (Lipitor) 80 mg tablet Take 1 tablet  (80 mg) by mouth once daily in the morning. Take before meals.      Breztri Aerosphere 160-9-4.8 mcg/actuation HFA aerosol inhaler Inhale 2 puffs 2 times a day.      clopidogrel (Plavix) 75 mg tablet Take 1 tablet (75 mg) by mouth once daily.      cyanocobalamin (Vitamin B-12) 1,000 mcg/mL injection INJECT 1 ML INTO THE MUSCLE ONCE MONTHLY      ezetimibe (Zetia) 10 mg tablet Take 1 tablet (10 mg) by mouth once daily.      furosemide (Lasix) 40 mg tablet Take 1 tablet (40 mg) by mouth once daily.      inhalational spacing device (Aerochamber MV) inhaler Use as instructed 1 each 1    ipratropium-albuteroL (Duo-Neb) 0.5-2.5 mg/3 mL nebulizer solution Take 3 mL by nebulization every 4 hours if needed for wheezing or shortness of breath. 1620 mL 11    isosorbide mononitrate ER (Imdur) 30 mg 24 hr tablet       levothyroxine (Synthroid, Levoxyl) 137 mcg tablet Take 1 tablet (137 mcg) by mouth once daily.      losartan (Cozaar) 50 mg tablet Take 1 tablet (50 mg) by mouth once daily.      metoprolol succinate XL (Toprol-XL) 100 mg 24 hr tablet        Current Facility-Administered Medications on File Prior to Visit   Medication Dose Route Frequency Provider Last Rate Last Admin    sodium chloride 0.9 % bolus 500 mL  500 mL intravenous Once Ioana Zuniga, APRN-CNP                   Medication compliance: Yes   Uses pill box/organizer: Yes    Carries medication list: Yes    Uses Inhalers appropriately: Yes     Blood Pressure Management  History of Hypertension: Yes   Medication Changes: No   Resting BP:  113/60    Heart Failure Management  Hx of Heart Failure: No    Smoking/Tobacco Assessment  Social History     Tobacco Use   Smoking Status Former    Current packs/day: 0.00    Average packs/day: 0.3 packs/day for 10.0 years (2.5 ttl pk-yrs)    Types: Cigarettes    Quit date:     Years since quittin.8   Smokeless Tobacco Never     Other Core Component Plan  Goal Status: In Progress  Other Core Component Goals:  Learn to manage bronchial hygiene  Resting BP < 160/90.  Use all medications properly and as prescribed.  Remain cigarette free.    Frequent Cough?: Non-productive  Educational Assessment:  Pulmonary Knowledge Test:   Intake: 12/15  Discharge:     Other Core Component Interventions/Education: Ensure patient is compliant with all medications throughout program.  Explain bronchial hygiene techniques.  Pre/post resting BPs at each session.  90 Day Reassessment: Reviewed effects of exercise on BP: Yes  Reviewed knowledge test: Yes  Remains cigarette free at this time.  BP remains within target goal, compliant with meds. Pt reports no issues at this time.    Individual Patient Goals:  Increase strength and endurance  Be able to walk more  Lose some weight    Goal Status: In Progress    Staff Comments:  90 Day Reassessment: Jeane has completed 11 sessions of pulmonary rehab. Endurance of 35 minutes per session after starting to use TM and METs have remained consistent at a max of 2.6. We will continue to increase resistance as able. Evaluation limited due to poor recent attendance.    Rehab Staff Signature: Tray Butler, RRT

## 2024-11-07 NOTE — PROGRESS NOTES
Pulmonary Rehabilitation  90 Day Reevaluation    Name: Jeane Gao  Medical Record Number: [id number]  YOB: 1940  Age: 84 y.o.    Today’s Date: 11/7/2024  Primary Care Physician: Vonnie Londono MD  Referring Physician: Yosi Duarte MD  Program Location: [facility]  Primary Diagnosis: COPD J44.9  Onset/Date of Diagnosis: 2024    Session #: 11    Falls Risk: Low  Psychosocial Assessment  Pre PHQ-9: 10  Post PHQ-9:   Sent PH-Q 9 to MD if score > 20: No; score < 20    Stress Management:  Pt reported/currently experiencing stress: Yes; Stress; Severity: mild and Anxiety; Severity: mild due to lung disease.  Patient uses stress management skills: Yes   History of: no history of anxiety or depression  Currently seeing a mental health provider: No, states she may speak with her PCP about this.  Social Support: Yes, Whom:Family  Pre CAT score: 14  Post CAT score:   Learning Assessment:  Learning assessment/barriers: None  Preferred learning method: Auditory, Visual, and Reading handout  Barriers: None  Stages of Change:Action    Psychosocial Plan  Goal Status: In Progress  Psychosocial Goals: Decrease CAT score  Decrease PHQ-9 score.  Learn about living with chronic lung disease.  Learn to use stress management techniques.    Psychosocial Interventions/Education: Educate patient on how to live with chronic lung disease.  Question patient on new or existing stress/anxiety issues at least once every 30 days.  90 Day Reassessment: Unable to assess due to poor recent attendance.    Oxygen Assessment  SpO2 at rest: 97 %  SpO2 with exertion: 88 %    Oxygen Use  Supplemental O2 prescribed: No,   Liters at rest: Room Air  Liters with exertion: Room Air  Hypoxia managed: Yes   Home Pulse Oximeter: Yes     Pre MMRC: 1  Post MMRC:     Oxygen Plan  Goal Status: In Progress  Oxygen Goals: Decrease MMRC score  Learn and use diaphragmatic breathing as needed.  Learn and use pursed lip breathing as  "needed.  Titrate supplemental O2 as needed to keep SpO2 at 88% or greater.    History of FLORES?: No    Oxygen Education/Interventions: Learning to use pursed lip breathing during exercise to help lessen dyspnea.  SpO2 checks with pulse oximeter at rest and with each modality.  Supplemental O2 as needed.  Titrate supplemental O2 as needed to keep SpO2 at 88% or greater.  90 Day Reassessment: Pt educated on diaphragmatic and pursed lip breathing techniques: Yes  Continuing to reinforce proper breathing techniques.  Spo2 of 87 or > maintained on RA with exercise.    Nutrition Assessment:  Hyperlipidemia: Yes     Current Dietary Guidelines:  None  Barriers to dietary change: no    Diabetes Assessment  History of Diabetes: No    Weight Management  Start of rehab weight: 204.8 lbs  Height: 65 in  Weight: 206.8 lbs  BMI: 34.4    Nutrition Plan  Goal Status: In Progress  Nutrition Goals: Learn about healthy eating habits.  Lose 5-15 lbs. while in program.    Nutrition Interventions/Education: Body weight checked weekly.  Discuss fat screener results and recommendations.  90 Day Reassessment: Weight gain of 2 lbs since start of rehab. Jeane stated she is trying to improve her diet.    Exercise Assessment  Home Exercise: No  Mode: NA  Frequency: NA  Duration: NA    6 Minute Walk Assessment   6 MWT distance: 590 ft  FiO2 used during test: RA  Jeane had a fall due to her shoe \"catching the ground\" during her walk. She states she had no injuries and braced her fall with her hand. No complaints after getting up or at any point after. Offered additional evaluation and patient declined.    Exercise Prescription  Exercise Prescription based on: 6 Minute Walk Test   Frequency:  2 days/week   Mode: Treadmill, Recumbent Cycle, Arm Ergometer, and Scifit Stepper   Duration: 20-40 total aerobic minutes   Intensity: RPE 11-13  Target HR:  Rest +30  MET Level: 2+  Patient wears supplemental O2: No   Modality Workload METs Duration (minutes) "   1 Pre-Exercise      2 Scifit Stepper 2.5 2.6 10 :00   3 Arm Ergometer UBE 2.0 2.0 10 :00   4 Recumbent Bike #4 2.6 10 :00   5 Treadmill 1.5/0 2.2 5 :00   6 Post-Exercise      Resistance Training: Yes   Home Exercise Prescription given: To be given prior to discharge from program.    Exercise Plan  Goal Status: In Progress  Exercise Goals: By discharge:  Increase exercise MET level by 5-10% each week  Increase total exercise duration to 30-45 minutes  Obtain 150 minutes/week of moderate intensity aerobic exercise  Initiate strength training 2-3 days a week  Initiate flexibility training 2-3 days a week  Establish a home exercise program before discharge    Exercise Interventions/Education: SPO2 goal is to add or titrate supplemnetal O2 as necessary at rest/exercise via nasal cannula or mask to prevent <88% SPO2.  Target goal for duration: 30-45 minutes; Increase MET level 5-10% each week or as tolerated.    Physical exercise restrictions: None  90 Day Reassessment: Changes made based on HR and RPE/D responses to exercise.  Received education each week present.  Endurance of 35 minutes per session, decreased due to starting to use TM.  Education Classes Completed: How to Live with COPD, Nutrition, and Smoking Cessation    Other Core Components/Risk Factor Assessment:  Medication adherence  Current Medications:   Current Outpatient Medications on File Prior to Visit   Medication Sig Dispense Refill    albuterol 90 mcg/actuation inhaler Inhale 2 puffs every 4 hours if needed for wheezing or shortness of breath. 18 g 3    amiodarone (Pacerone) 200 mg tablet Take 1 tablet (200 mg) by mouth 2 times a day.      apixaban 5 mg (74 tabs) tablets,dose pack FOLLOW DIRECTIONS ON PACKAGING. Take 10MG (2 TABLETS) by mouth 2 times a day FOR 7 DAYS, THEN TAKE 5MG (1 TABLET) TWICE DAILY THEREAFTER. 74 tablet 0    aspirin 81 mg EC tablet Take 1 tablet (81 mg) by mouth once daily.      atorvastatin (Lipitor) 80 mg tablet Take 1 tablet  (80 mg) by mouth once daily in the morning. Take before meals.      Breztri Aerosphere 160-9-4.8 mcg/actuation HFA aerosol inhaler Inhale 2 puffs 2 times a day.      clopidogrel (Plavix) 75 mg tablet Take 1 tablet (75 mg) by mouth once daily.      cyanocobalamin (Vitamin B-12) 1,000 mcg/mL injection INJECT 1 ML INTO THE MUSCLE ONCE MONTHLY      ezetimibe (Zetia) 10 mg tablet Take 1 tablet (10 mg) by mouth once daily.      furosemide (Lasix) 40 mg tablet Take 1 tablet (40 mg) by mouth once daily.      inhalational spacing device (Aerochamber MV) inhaler Use as instructed 1 each 1    ipratropium-albuteroL (Duo-Neb) 0.5-2.5 mg/3 mL nebulizer solution Take 3 mL by nebulization every 4 hours if needed for wheezing or shortness of breath. 1620 mL 11    isosorbide mononitrate ER (Imdur) 30 mg 24 hr tablet       levothyroxine (Synthroid, Levoxyl) 137 mcg tablet Take 1 tablet (137 mcg) by mouth once daily.      losartan (Cozaar) 50 mg tablet Take 1 tablet (50 mg) by mouth once daily.      metoprolol succinate XL (Toprol-XL) 100 mg 24 hr tablet        Current Facility-Administered Medications on File Prior to Visit   Medication Dose Route Frequency Provider Last Rate Last Admin    sodium chloride 0.9 % bolus 500 mL  500 mL intravenous Once Ioana Zuniga, APRN-CNP                   Medication compliance: Yes   Uses pill box/organizer: Yes    Carries medication list: Yes    Uses Inhalers appropriately: Yes     Blood Pressure Management  History of Hypertension: Yes   Medication Changes: No   Resting BP:  113/60    Heart Failure Management  Hx of Heart Failure: No    Smoking/Tobacco Assessment  Social History     Tobacco Use   Smoking Status Former    Current packs/day: 0.00    Average packs/day: 0.3 packs/day for 10.0 years (2.5 ttl pk-yrs)    Types: Cigarettes    Quit date:     Years since quittin.8   Smokeless Tobacco Never     Other Core Component Plan  Goal Status: In Progress  Other Core Component Goals:  Learn to manage bronchial hygiene  Resting BP < 160/90.  Use all medications properly and as prescribed.  Remain cigarette free.    Frequent Cough?: Non-productive  Educational Assessment:  Pulmonary Knowledge Test:   Intake: 12/15  Discharge:     Other Core Component Interventions/Education: Ensure patient is compliant with all medications throughout program.  Explain bronchial hygiene techniques.  Pre/post resting BPs at each session.  90 Day Reassessment: Reviewed effects of exercise on BP: Yes  Reviewed knowledge test: Yes  Remains cigarette free at this time.  BP remains within target goal, compliant with meds. Pt reports no issues at this time.    Individual Patient Goals:  Increase strength and endurance  Be able to walk more  Lose some weight    Goal Status: In Progress    Staff Comments:  90 Day Reassessment: Jeane has completed 11 sessions of pulmonary rehab. Endurance of 35 minutes per session after starting to use TM and METs have remained consistent at a max of 2.6. We will continue to increase resistance as able. Evaluation limited due to poor recent attendance.    Rehab Staff Signature: Tray Butler, RRT

## 2024-11-12 ENCOUNTER — APPOINTMENT (OUTPATIENT)
Dept: CARDIAC REHAB | Facility: HOSPITAL | Age: 84
End: 2024-11-12
Payer: MEDICARE

## 2024-11-13 ENCOUNTER — HOSPITAL ENCOUNTER (OUTPATIENT)
Dept: RADIOLOGY | Facility: HOSPITAL | Age: 84
Discharge: HOME | End: 2024-11-13
Payer: MEDICARE

## 2024-11-13 DIAGNOSIS — R91.1 PULMONARY NODULE: ICD-10-CM

## 2024-11-13 PROCEDURE — 71250 CT THORAX DX C-: CPT

## 2024-11-14 ENCOUNTER — APPOINTMENT (OUTPATIENT)
Dept: CARDIAC REHAB | Facility: HOSPITAL | Age: 84
End: 2024-11-14
Payer: MEDICARE

## 2024-11-19 ENCOUNTER — APPOINTMENT (OUTPATIENT)
Dept: CARDIAC REHAB | Facility: HOSPITAL | Age: 84
End: 2024-11-19
Payer: MEDICARE

## 2024-11-19 ENCOUNTER — TELEPHONE (OUTPATIENT)
Dept: PULMONOLOGY | Facility: HOSPITAL | Age: 84
End: 2024-11-19

## 2024-11-19 NOTE — TELEPHONE ENCOUNTER
Patient acknowledged understanding. All questions answered at this time.    ----- Message from Karen Garcia sent at 11/19/2024  1:14 PM EST -----  Please call the patient and let her know that her lung nodules have remained stable, I will order a follow up CT chest in 6 months at her follow up with me in January.

## 2024-11-21 ENCOUNTER — APPOINTMENT (OUTPATIENT)
Dept: CARDIAC REHAB | Facility: HOSPITAL | Age: 84
End: 2024-11-21
Payer: MEDICARE

## 2024-11-26 ENCOUNTER — APPOINTMENT (OUTPATIENT)
Dept: CARDIAC REHAB | Facility: HOSPITAL | Age: 84
End: 2024-11-26
Payer: MEDICARE

## 2024-12-03 ENCOUNTER — HOSPITAL ENCOUNTER (OUTPATIENT)
Dept: VASCULAR MEDICINE | Facility: HOSPITAL | Age: 84
Discharge: HOME | End: 2024-12-03
Payer: MEDICARE

## 2024-12-03 ENCOUNTER — APPOINTMENT (OUTPATIENT)
Dept: CARDIAC REHAB | Facility: HOSPITAL | Age: 84
End: 2024-12-03
Payer: MEDICARE

## 2024-12-03 DIAGNOSIS — I73.9 PAD (PERIPHERAL ARTERY DISEASE) (CMS-HCC): ICD-10-CM

## 2024-12-03 DIAGNOSIS — R60.1 GENERALIZED EDEMA: ICD-10-CM

## 2024-12-03 PROCEDURE — 93922 UPR/L XTREMITY ART 2 LEVELS: CPT

## 2024-12-03 PROCEDURE — 93970 EXTREMITY STUDY: CPT

## 2024-12-03 PROCEDURE — 93970 EXTREMITY STUDY: CPT | Performed by: SURGERY

## 2024-12-03 PROCEDURE — 93922 UPR/L XTREMITY ART 2 LEVELS: CPT | Performed by: SURGERY

## 2024-12-05 ENCOUNTER — APPOINTMENT (OUTPATIENT)
Dept: CARDIAC REHAB | Facility: HOSPITAL | Age: 84
End: 2024-12-05
Payer: MEDICARE

## 2024-12-10 ENCOUNTER — APPOINTMENT (OUTPATIENT)
Dept: CARDIAC REHAB | Facility: HOSPITAL | Age: 84
End: 2024-12-10
Payer: MEDICARE

## 2024-12-12 ENCOUNTER — APPOINTMENT (OUTPATIENT)
Dept: CARDIAC REHAB | Facility: HOSPITAL | Age: 84
End: 2024-12-12
Payer: MEDICARE

## 2024-12-17 ENCOUNTER — APPOINTMENT (OUTPATIENT)
Dept: CARDIAC REHAB | Facility: HOSPITAL | Age: 84
End: 2024-12-17
Payer: MEDICARE

## 2024-12-19 ENCOUNTER — APPOINTMENT (OUTPATIENT)
Dept: CARDIAC REHAB | Facility: HOSPITAL | Age: 84
End: 2024-12-19
Payer: MEDICARE

## 2024-12-26 ENCOUNTER — APPOINTMENT (OUTPATIENT)
Dept: CARDIAC REHAB | Facility: HOSPITAL | Age: 84
End: 2024-12-26
Payer: MEDICARE

## 2025-01-02 ENCOUNTER — APPOINTMENT (OUTPATIENT)
Dept: CARDIAC REHAB | Facility: HOSPITAL | Age: 85
End: 2025-01-02
Payer: MEDICARE

## 2025-01-07 ENCOUNTER — APPOINTMENT (OUTPATIENT)
Dept: CARDIAC REHAB | Facility: HOSPITAL | Age: 85
End: 2025-01-07
Payer: MEDICARE

## 2025-01-09 ENCOUNTER — APPOINTMENT (OUTPATIENT)
Dept: CARDIAC REHAB | Facility: HOSPITAL | Age: 85
End: 2025-01-09
Payer: MEDICARE

## 2025-01-14 ENCOUNTER — APPOINTMENT (OUTPATIENT)
Dept: CARDIAC REHAB | Facility: HOSPITAL | Age: 85
End: 2025-01-14
Payer: MEDICARE

## 2025-01-15 ENCOUNTER — APPOINTMENT (OUTPATIENT)
Dept: VASCULAR MEDICINE | Facility: HOSPITAL | Age: 85
End: 2025-01-15
Payer: MEDICARE

## 2025-01-16 ENCOUNTER — APPOINTMENT (OUTPATIENT)
Dept: CARDIAC REHAB | Facility: HOSPITAL | Age: 85
End: 2025-01-16
Payer: MEDICARE

## 2025-01-21 ENCOUNTER — APPOINTMENT (OUTPATIENT)
Dept: VASCULAR SURGERY | Facility: HOSPITAL | Age: 85
End: 2025-01-21
Payer: MEDICARE

## 2025-01-21 ENCOUNTER — APPOINTMENT (OUTPATIENT)
Dept: CARDIAC REHAB | Facility: HOSPITAL | Age: 85
End: 2025-01-21
Payer: MEDICARE

## 2025-01-23 ENCOUNTER — APPOINTMENT (OUTPATIENT)
Dept: CARDIAC REHAB | Facility: HOSPITAL | Age: 85
End: 2025-01-23
Payer: MEDICARE

## 2025-01-28 ENCOUNTER — OFFICE VISIT (OUTPATIENT)
Dept: PULMONOLOGY | Facility: HOSPITAL | Age: 85
End: 2025-01-28
Payer: MEDICARE

## 2025-01-28 ENCOUNTER — APPOINTMENT (OUTPATIENT)
Dept: CARDIAC REHAB | Facility: HOSPITAL | Age: 85
End: 2025-01-28
Payer: MEDICARE

## 2025-01-28 VITALS
DIASTOLIC BLOOD PRESSURE: 62 MMHG | HEIGHT: 66 IN | HEART RATE: 81 BPM | WEIGHT: 206 LBS | SYSTOLIC BLOOD PRESSURE: 118 MMHG | BODY MASS INDEX: 33.11 KG/M2 | RESPIRATION RATE: 16 BRPM | OXYGEN SATURATION: 97 %

## 2025-01-28 DIAGNOSIS — R93.89 ABNORMAL CT OF THE CHEST: ICD-10-CM

## 2025-01-28 DIAGNOSIS — R91.1 PULMONARY NODULE: ICD-10-CM

## 2025-01-28 DIAGNOSIS — J44.9 CHRONIC OBSTRUCTIVE PULMONARY DISEASE, UNSPECIFIED COPD TYPE (MULTI): ICD-10-CM

## 2025-01-28 DIAGNOSIS — J45.909 ASTHMA, UNSPECIFIED ASTHMA SEVERITY, UNSPECIFIED WHETHER COMPLICATED, UNSPECIFIED WHETHER PERSISTENT (HHS-HCC): Primary | ICD-10-CM

## 2025-01-28 PROCEDURE — 99215 OFFICE O/P EST HI 40 MIN: CPT | Performed by: NURSE PRACTITIONER

## 2025-01-28 PROCEDURE — 1159F MED LIST DOCD IN RCRD: CPT | Performed by: NURSE PRACTITIONER

## 2025-01-28 PROCEDURE — 1036F TOBACCO NON-USER: CPT | Performed by: NURSE PRACTITIONER

## 2025-01-28 RX ORDER — ALBUTEROL SULFATE 90 UG/1
4 INHALANT RESPIRATORY (INHALATION) ONCE
OUTPATIENT
Start: 2025-01-28

## 2025-01-28 RX ORDER — ALBUTEROL SULFATE 0.83 MG/ML
3 SOLUTION RESPIRATORY (INHALATION) ONCE
OUTPATIENT
Start: 2025-01-28 | End: 2025-01-28

## 2025-01-28 RX ORDER — IPRATROPIUM BROMIDE AND ALBUTEROL SULFATE 2.5; .5 MG/3ML; MG/3ML
3 SOLUTION RESPIRATORY (INHALATION) EVERY 4 HOURS PRN
Qty: 1620 ML | Refills: 11 | Status: SHIPPED | OUTPATIENT
Start: 2025-01-28

## 2025-01-28 RX ORDER — TIZANIDINE 2 MG/1
2 TABLET ORAL NIGHTLY
COMMUNITY
Start: 2025-01-24

## 2025-01-28 RX ORDER — FLUTICASONE FUROATE, UMECLIDINIUM BROMIDE AND VILANTEROL TRIFENATATE 200; 62.5; 25 UG/1; UG/1; UG/1
1 POWDER RESPIRATORY (INHALATION) DAILY
Qty: 1 EACH | Refills: 11 | Status: SHIPPED | OUTPATIENT
Start: 2025-01-28

## 2025-01-28 ASSESSMENT — ENCOUNTER SYMPTOMS
UNEXPECTED WEIGHT CHANGE: 0
FATIGUE: 0
RHINORRHEA: 0
COUGH: 1
SHORTNESS OF BREATH: 1
CHILLS: 0
WHEEZING: 0
FEVER: 0

## 2025-01-28 NOTE — PROGRESS NOTES
Subjective   Patient ID: Jeane Gao is a 84 y.o. female who presents for follow up.     HPI: Patient has PMH of COVID in October 2023, HTN, atrial fibrillation, CAD/MI s/p CABG 2010, hiatal hernia and hypothyroidism. She is here for a follow up visit today she saw prior NP in the office. She states that she had no issues with shortness of breath prior to getting COVID in October 2023. She did used to smoke but only at less than 1/2 ppd x 10 years, quit in 1985. She states that she was exposed to some second hand smoke throughout her life. She states that she does have a family history of asthma. She denies sinus drainage or congestion. She has occasional cough and wheezing. She is currently on Breztri and taking it twice a day now and does have improvement in her breathing. She is taking albuterol about once a day. She has never been diagnosed with asthma in the past.     Review of Systems   Constitutional:  Negative for chills, fatigue, fever and unexpected weight change.   HENT:  Negative for congestion, postnasal drip and rhinorrhea.    Respiratory:  Positive for cough (denies hemoptysis.) and shortness of breath. Negative for wheezing.    Cardiovascular:  Negative for chest pain and leg swelling.   All other systems reviewed and are negative.      Objective   Physical Exam  Vitals reviewed.   Constitutional:       Appearance: Normal appearance.   HENT:      Head: Normocephalic.   Cardiovascular:      Rate and Rhythm: Normal rate and regular rhythm.   Pulmonary:      Effort: Pulmonary effort is normal.      Breath sounds: Decreased breath sounds present.   Skin:     General: Skin is warm and dry.   Neurological:      Mental Status: She is alert.         Assessment/Plan   Bronchial asthma  Suspect COPD overlap  Abnormal CT chest  Former light smoker, quit 1985  History of atrial fibrillation  History of MI s/p CABG    Plan:    -PFTs done in May 2024 with moderate to severe obstruction with significant BDR,  discussed with patient at length that this is suggestive of asthma. She has never been diagnosed with asthma in the past and only with severe stage 3 COPD. I reviewed CT chest no evidence of any emphysema, I discussed this with patient. I recommend a repeat PFT for her prior to next visit to follow up now that she is on asthma management.   FEV1/FVC 63, FEV1 49-62, significant BDR, , DLCO 78%.  -Recommend change Breztri to Trelegy 200 one puff once a day if covered, she would prefer once a day dosing.   -Continue albuterol and nebulizer as needed.  -Eos have been 240 in the past, she denies history of allergic rhinitis.  -She only has smoked at less than 1/2 ppd x 10 years, quit in 1985. She was exposed to second hand smoke but not excessively.   -CT chest was done in April 2024 and showed a RUL 1.4 cm ground glass lung nodule. Recommendation was a follow up in 6 months. This was done 11/19/24 and showed two areas of ground glass opacities that are reported as stable, recommendation was again for 6 month follow up, I ordered this for end of May.  -She does follow up with cardiology.     Overall I reviewed and discussed her testing results at length. I discussed she likely has a COPD overlap but testing highly indicative of bronchial asthma. She was a very light smoker and quit in 1985. I will optimize management for her today and repeat PFTs prior to next visit also. She is requesting to see Dr. Duarte on follow up, I will bring her back with him in June, after testing is completed. I instructed patient to call sooner if needed.      Total time:  40 min.

## 2025-01-28 NOTE — PATIENT INSTRUCTIONS
Start on Trelegy one puff once a day, everyday if covered. You can stop Breztri when you get this.   Continue albuterol and nebulizer as needed.   Please get repeat lung test when able to.   Please get CT scan of chest in the end of May.   Call with any questions or concerns.  Follow up with Dr. Duarte in June.

## 2025-01-30 ENCOUNTER — APPOINTMENT (OUTPATIENT)
Dept: CARDIAC REHAB | Facility: HOSPITAL | Age: 85
End: 2025-01-30
Payer: MEDICARE

## 2025-02-04 ENCOUNTER — APPOINTMENT (OUTPATIENT)
Dept: CARDIAC REHAB | Facility: HOSPITAL | Age: 85
End: 2025-02-04
Payer: MEDICARE

## 2025-02-06 ENCOUNTER — APPOINTMENT (OUTPATIENT)
Dept: CARDIAC REHAB | Facility: HOSPITAL | Age: 85
End: 2025-02-06
Payer: MEDICARE

## 2025-02-11 ENCOUNTER — OFFICE VISIT (OUTPATIENT)
Dept: VASCULAR SURGERY | Facility: HOSPITAL | Age: 85
End: 2025-02-11
Payer: MEDICARE

## 2025-02-11 ENCOUNTER — APPOINTMENT (OUTPATIENT)
Dept: CARDIAC REHAB | Facility: HOSPITAL | Age: 85
End: 2025-02-11
Payer: MEDICARE

## 2025-02-11 VITALS
DIASTOLIC BLOOD PRESSURE: 69 MMHG | HEART RATE: 69 BPM | WEIGHT: 203 LBS | SYSTOLIC BLOOD PRESSURE: 115 MMHG | BODY MASS INDEX: 32.77 KG/M2

## 2025-02-11 DIAGNOSIS — R60.1 GENERALIZED EDEMA: Primary | ICD-10-CM

## 2025-02-11 DIAGNOSIS — I65.23 CAROTID STENOSIS, BILATERAL: ICD-10-CM

## 2025-02-11 PROCEDURE — 99213 OFFICE O/P EST LOW 20 MIN: CPT | Performed by: SURGERY

## 2025-02-11 PROCEDURE — 1036F TOBACCO NON-USER: CPT | Performed by: SURGERY

## 2025-02-11 NOTE — PROGRESS NOTES
Subjective   Patient ID: Jeane Gao is a 84 y.o. female who presents for Follow-up (PVR/ venous results/).  HPI  Patient is follow-up secondary recent noninvasive arterial and venous testing  Her primary complaint is significant swelling in the left lower extremity  This been ongoing for the past 3 to 6 months and slight slightly worsening  She is active having some discomfort in her ankle on the left and seeing her PCP this afternoon    Review of Systems  Review of Systems    Constitutional:  no generalized malaise overall feels well, energy levels intact, no complaints specifically noted  HEENT:  No blurry vision, no visual aides noted, no hearing loss no ear ache no nose bleeds noted, no dysphagia, no congestion otherwise no pertinent positives noted  Cardiovascular:  no palpitations, chest pain or heaviness noted, no leg swelling, no numbness or tingling in the lower extremity noted  Respiratory:  no shortness of breath, no productive cough noted, no conversation dyspnea or difficulty breathing  Gastrointestinal:  no abdominal pain, no nausea or vomiting, appetite intact, no bowel irregularities noted  Genitourinary:   no urinary incontinence, frequency or urgency issues noted, no hematuria or burning sensation issues  Musculoskeletal:  No muscle aches or pains, no joint discomfort noted, no back pain noted otherwise feels well  Skin: no ulcerations, skin color issues or wounds upper or lower extremities  Neurologic: no dizziness, no hemiplegia, no hemiparesis, no obvious visual deficits noted  Psychiatric: no depression, no memory loss noted, no suicidal ideation  Endocrine: no weight loss or gain, no temperature concerns hot or cold intolerance  Hemogolotic/Lymphatic: no bruising, excessive bleeding, no swelling in the groins or neck noted    Objective   Physical Exam  Physical exam    Constitutional: alert and in no acute distress verbal  Eyes: No erythema swelling or discharge noted  Neck: supple,  symmetric, trachea midline, no masses noted  Cardiovascular: Carotid pulses 2+, no obvious bruit, no Jugular distension noted, no thrill, heart regular rate, lower extremity vascular exam intact, cap refill <2 sec  Pulmonary:  Bilateral breath sounds intact, clear with rales rhonchi or wheeze  Abdomen: soft non tender, no pulsatile masses noted, no rebound rigidity or guarding noted  Skin: intact warm no abnormal turgor  Psychiatric: alert without any obvious cognitive issues, oriented to person, place, and time    Assessment/Plan   Left lower extremity swelling  May Thurner syndrome  CT abdomen pelvis delayed venous phase ordered and pending  We also order carotid duplex secondary to history of carotid stenosis  I will see her back after testing further recommendations pending.  Reviewed arterial testing that demonstrates no significant arterial insufficiency  Reviewed venous insufficiency test demonstrating deep venous insufficiency.         Filippo Reynolds DO 02/11/25 2:17 PM

## 2025-02-13 ENCOUNTER — APPOINTMENT (OUTPATIENT)
Dept: CARDIAC REHAB | Facility: HOSPITAL | Age: 85
End: 2025-02-13
Payer: MEDICARE

## 2025-02-20 ENCOUNTER — APPOINTMENT (OUTPATIENT)
Dept: CARDIAC REHAB | Facility: HOSPITAL | Age: 85
End: 2025-02-20
Payer: MEDICARE

## 2025-02-25 ENCOUNTER — APPOINTMENT (OUTPATIENT)
Dept: CARDIAC REHAB | Facility: HOSPITAL | Age: 85
End: 2025-02-25
Payer: MEDICARE

## 2025-02-27 ENCOUNTER — APPOINTMENT (OUTPATIENT)
Dept: CARDIAC REHAB | Facility: HOSPITAL | Age: 85
End: 2025-02-27
Payer: MEDICARE

## 2025-02-28 ENCOUNTER — HOSPITAL ENCOUNTER (OUTPATIENT)
Dept: VASCULAR MEDICINE | Facility: HOSPITAL | Age: 85
Discharge: HOME | End: 2025-02-28
Payer: MEDICARE

## 2025-02-28 ENCOUNTER — HOSPITAL ENCOUNTER (OUTPATIENT)
Dept: RADIOLOGY | Facility: HOSPITAL | Age: 85
Discharge: HOME | End: 2025-02-28
Payer: MEDICARE

## 2025-02-28 DIAGNOSIS — R60.1 GENERALIZED EDEMA: ICD-10-CM

## 2025-02-28 DIAGNOSIS — I65.23 CAROTID STENOSIS, BILATERAL: ICD-10-CM

## 2025-02-28 PROCEDURE — 2550000001 HC RX 255 CONTRASTS: Performed by: PHYSICIAN ASSISTANT

## 2025-02-28 PROCEDURE — 93880 EXTRACRANIAL BILAT STUDY: CPT

## 2025-02-28 PROCEDURE — 74177 CT ABD & PELVIS W/CONTRAST: CPT

## 2025-02-28 PROCEDURE — 93880 EXTRACRANIAL BILAT STUDY: CPT | Performed by: STUDENT IN AN ORGANIZED HEALTH CARE EDUCATION/TRAINING PROGRAM

## 2025-02-28 RX ADMIN — IOHEXOL 75 ML: 350 INJECTION, SOLUTION INTRAVENOUS at 15:47

## 2025-03-04 ENCOUNTER — APPOINTMENT (OUTPATIENT)
Dept: CARDIAC REHAB | Facility: HOSPITAL | Age: 85
End: 2025-03-04
Payer: MEDICARE

## 2025-03-06 ENCOUNTER — APPOINTMENT (OUTPATIENT)
Dept: CARDIAC REHAB | Facility: HOSPITAL | Age: 85
End: 2025-03-06
Payer: MEDICARE

## 2025-03-11 ENCOUNTER — APPOINTMENT (OUTPATIENT)
Dept: CARDIAC REHAB | Facility: HOSPITAL | Age: 85
End: 2025-03-11
Payer: MEDICARE

## 2025-03-13 ENCOUNTER — APPOINTMENT (OUTPATIENT)
Dept: CARDIAC REHAB | Facility: HOSPITAL | Age: 85
End: 2025-03-13
Payer: MEDICARE

## 2025-03-18 ENCOUNTER — APPOINTMENT (OUTPATIENT)
Dept: CARDIAC REHAB | Facility: HOSPITAL | Age: 85
End: 2025-03-18
Payer: MEDICARE

## 2025-04-10 ENCOUNTER — OFFICE VISIT (OUTPATIENT)
Dept: VASCULAR SURGERY | Facility: HOSPITAL | Age: 85
End: 2025-04-10
Payer: MEDICARE

## 2025-04-10 VITALS
BODY MASS INDEX: 33.25 KG/M2 | HEART RATE: 67 BPM | WEIGHT: 206 LBS | SYSTOLIC BLOOD PRESSURE: 186 MMHG | DIASTOLIC BLOOD PRESSURE: 63 MMHG

## 2025-04-10 DIAGNOSIS — I89.0 LYMPHEDEMA: Primary | ICD-10-CM

## 2025-04-10 PROCEDURE — 1159F MED LIST DOCD IN RCRD: CPT | Performed by: SURGERY

## 2025-04-10 PROCEDURE — 1036F TOBACCO NON-USER: CPT | Performed by: SURGERY

## 2025-04-10 PROCEDURE — 99213 OFFICE O/P EST LOW 20 MIN: CPT | Performed by: SURGERY

## 2025-04-10 NOTE — PROGRESS NOTES
Subjective   Patient ID: Jeane Gao is a 84 y.o. female who presents for Follow-up (Results ).  HPI  Patient has persistent bilateral lower extremity edema with which includes her foot  At this time she is well she is active and amatory having some tendinosis/-itis of the left foot causing her some discomfort however no active ulcer sores or rest pain calf region noted    Review of Systems  Review of Systems    Constitutional:  no generalized malaise overall feels well, energy levels intact, no complaints specifically noted  HEENT:  No blurry vision, no visual aides noted, no hearing loss no ear ache no nose bleeds noted, no dysphagia, no congestion otherwise no pertinent positives noted  Cardiovascular:  no palpitations, chest pain or heaviness noted, no leg swelling, no numbness or tingling in the lower extremity noted  Respiratory:  no shortness of breath, no productive cough noted, no conversation dyspnea or difficulty breathing  Gastrointestinal:  no abdominal pain, no nausea or vomiting, appetite intact, no bowel irregularities noted  Genitourinary:   no urinary incontinence, frequency or urgency issues noted, no hematuria or burning sensation issues  Musculoskeletal:  No muscle aches or pains, no joint discomfort noted, no back pain noted otherwise feels well  Skin: no ulcerations, skin color issues or wounds upper or lower extremities  Neurologic: no dizziness, no hemiplegia, no hemiparesis, no obvious visual deficits noted  Psychiatric: no depression, no memory loss noted, no suicidal ideation  Endocrine: no weight loss or gain, no temperature concerns hot or cold intolerance  Hemogolotic/Lymphatic: no bruising, excessive bleeding, no swelling in the groins or neck noted    Objective   Physical Exam  Physical exam    Constitutional: alert and in no acute distress verbal  Eyes: No erythema swelling or discharge noted  Neck: supple, symmetric, trachea midline, no masses noted  Cardiovascular: Carotid pulses  2+, no obvious bruit, no Jugular distension noted, no thrill, heart regular rate, lower extremity vascular exam intact, cap refill <2 sec  Pulmonary:  Bilateral breath sounds intact, clear with rales rhonchi or wheeze  Abdomen: soft non tender, no pulsatile masses noted, no rebound rigidity or guarding noted  Skin: intact warm no abnormal turgor  Psychiatric: alert without any obvious cognitive issues, oriented to person, place, and time  1+ pretibial edema with pedal and bilateral extremity left slightly worse than right    Assessment/Plan   Lymphedema  Continue compression elevation activity  Lymphedema referral  No acute vascular mention from my standpoint  Follow-up as needed         Filippo Reynolds DO 04/10/25 3:02 PM

## 2025-04-30 ENCOUNTER — EVALUATION (OUTPATIENT)
Dept: OCCUPATIONAL THERAPY | Facility: HOSPITAL | Age: 85
End: 2025-04-30
Payer: MEDICARE

## 2025-04-30 DIAGNOSIS — I89.0 LYMPHEDEMA: ICD-10-CM

## 2025-04-30 PROCEDURE — 97165 OT EVAL LOW COMPLEX 30 MIN: CPT | Mod: GO | Performed by: OCCUPATIONAL THERAPIST

## 2025-04-30 PROCEDURE — 97535 SELF CARE MNGMENT TRAINING: CPT | Mod: GO | Performed by: OCCUPATIONAL THERAPIST

## 2025-04-30 ASSESSMENT — PATIENT HEALTH QUESTIONNAIRE - PHQ9
10. IF YOU CHECKED OFF ANY PROBLEMS, HOW DIFFICULT HAVE THESE PROBLEMS MADE IT FOR YOU TO DO YOUR WORK, TAKE CARE OF THINGS AT HOME, OR GET ALONG WITH OTHER PEOPLE: SOMEWHAT DIFFICULT
1. LITTLE INTEREST OR PLEASURE IN DOING THINGS: MORE THAN HALF THE DAYS
SUM OF ALL RESPONSES TO PHQ9 QUESTIONS 1 AND 2: 2
2. FEELING DOWN, DEPRESSED OR HOPELESS: NOT AT ALL

## 2025-04-30 ASSESSMENT — ENCOUNTER SYMPTOMS
DEPRESSION: 1
OCCASIONAL FEELINGS OF UNSTEADINESS: 0
LOSS OF SENSATION IN FEET: 0

## 2025-04-30 ASSESSMENT — PAIN SCALES - GENERAL: PAINLEVEL_OUTOF10: 8

## 2025-04-30 ASSESSMENT — ACTIVITIES OF DAILY LIVING (ADL): HOME_MANAGEMENT_TIME_ENTRY: 20

## 2025-04-30 ASSESSMENT — PAIN - FUNCTIONAL ASSESSMENT: PAIN_FUNCTIONAL_ASSESSMENT: 0-10

## 2025-04-30 NOTE — PROGRESS NOTES
Occupational Therapy    Evaluation/Treatment    Patient Name: Jeane Gao  MRN: [id number]  : 1940  Today's Date: 2025  Name and  confirmed     Time Calculation  Start Time: 1400  Stop Time: 1500  Time Calculation (min): 60 min  OT Evaluation Time Entry  OT Evaluation (Low) Time Entry: 40  OT Therapeutic Procedures Time Entry  Self Care/Home Management (ADLs) Time Entry: 20     Visit# 1  Assessment:  Pt has secondary lymphedema in CARMEN LE, phase 1, stage 2 with moderate swelling.   Recommend inelastic wraps for reduction.  Future recommendation for compression knee highs for long-term home management, 20-30 mmHg.        Pain after session no change.      Plan:  Pt was provided with education on the lymphatic system, lymphedema and what treatment will include.  Education today touched on compression, exercises, skin care and MLD.  Pt is to elevate as able and increase activity, especially walking.  Compression was initiated.  Educated pt on compression.  Will provided tg  when they are available.   OT treament may include ADLs, therapeutic exercises and activites, aquatic therapy, edema control, lymphedema management, instruction on garments and a pump and HEP.    Subjective   Current Problem:  1. Lymphedema  Referral to Occupational Therapy    Follow Up In Occupational Therapy        General:   Pt is wearing an aircast on the left ankle for acute tendinosis.  She received from Dr. Ramos and has been wearing for a couple of months.  OT Received On: 25States she has had swelling for a couple of months.  States swelling is worse in the left.    Lives with .  She sleeps in a bed. She states she is mostly independent but most recently is struggling.  Pt reports a family history of lymphedema.   Precautions:  Standard lymphedema precautions    Pain:  No changePain Assessment  Pain Assessment: 0-10  0-10 (Numeric) Pain Score: 8  Pain Type: Acute pain  Pain Location: Ankle  Pain Orientation:  Left        Lymphedema Assessment     Right Lower Extremity:  R Metatarsal (cm): 24 cm  R Heel Y Angle: 32 cm  R Ankle (cm): 28 cm  R 10 cm Above Ankle (cm): 32 cm  R 20 cm Above Ankle (cm): 45 cm  R 30 cm Above Ankle (cm): 46.5 cm  R 40 cm Above Ankle (cm): 0 cm  R Knee (cm): 0 cm  R 10 cm Above Knee (cm): 0 cm  R 20 cm Above Knee (cm): 0 cm  R 30 cm Above Knee (cm): 0 cm  R 40 cm Above Knee (cm): 0 cm  R 50 cm Above Knee (cm): 0 cm  Right lower extremity total: 207.5  Left Lower Extremity:  L Metatarsal (cm): 23 cm  L Heel Y Angle: 32.5 cm  L Ankle (cm): 30.5 cm  L 10 cm Above Ankle (cm): 33.5 cm  L 20 cm Above Ankle (cm): 47 cm  L 30 cm Above Ankle (cm): 49.5 cm  L 40 cm Above Ankle (cm): 0 cm  L Knee (cm): 0 cm  L 10 cm Above Knee (cm): 0 cm  L 20 cm Above Knee (cm): 0 cm  L 30 cm Above Knee (cm): 0 cm  L 40 cm Above Knee (cm): 0 cm  L 50 cm Above Knee (cm): 0 cm  Left Lower extremity total: 216    LE Skin Appearance/Condition and Girth:  Edema - Pitting: yes  Hyperpigmentation: yes  +Stemmer Sign: yes  Other (comment): deep toe creases    Pt has redness and some warmth on the top of the left foot.  She is to contact her doctor if she is concerned or this increases.      Pain Assessment:  Pain Assessment: 0-10  0-10 (Numeric) Pain Score: 8  Pain Type: Acute pain  Pain Location: Ankle  Pain Orientation: Left  Outcome Measures: LLIS score of 85.     OP EDUCATION:  Education  Individual(s) Educated: Patient  Education Provided: Diagnosis & Precautions, Edema control, Lymphedema, Symptom management, Risk and benefits of OT discussed with patient or other, POC discussed and agreed upon  Home Program: Edema control, Handout issued  Equipment: Tubigrip  Risk and Benefits Discussed with Patient/Caregiver/Other: yes  Patient/Caregiver Demonstrated Understanding: yes  Plan of Care Discussed and Agreed Upon: yes  Patient Response to Education: Patient/Caregiver Verbalized Understanding of Information    Goals:  Active        OT Goals       OT Goal 1       Start:  04/30/25    Expected End:  10/30/25       STG: Pt will be independent with self management, including use of garments, pump if indicated, self MLD, ability to recognize signs of infection/cellulitis and exercise/HEP to minimize risk of progression of symptoms and skin infections/cellulitis.          OT Goal 2       Start:  04/30/25    Expected End:  10/30/25       STG: Decrease girth in bilateral LEs by 20 cm each for improved mobility and better clothing/shoe fit.           OT Goal 3       Start:  04/30/25    Expected End:  10/30/25       LTG: Pt will show improvement on the LLIS impairment scale to no greater than 30% impaired.              OT Problem       PATIENT STATED GOAL decrease size of legs and pain.       Start:  04/30/25    Expected End:  10/30/25       PATIENT STATED GOAL decrease size of legs and pain.

## 2025-05-06 ENCOUNTER — HOSPITAL ENCOUNTER (OUTPATIENT)
Dept: RESPIRATORY THERAPY | Facility: HOSPITAL | Age: 85
Discharge: HOME | End: 2025-05-06
Payer: MEDICARE

## 2025-05-06 DIAGNOSIS — J45.909 ASTHMA, UNSPECIFIED ASTHMA SEVERITY, UNSPECIFIED WHETHER COMPLICATED, UNSPECIFIED WHETHER PERSISTENT (HHS-HCC): ICD-10-CM

## 2025-05-06 LAB
MGC ASCENT PFT - FEV1 - POST: 1.36
MGC ASCENT PFT - FEV1 - PRE: 1.39
MGC ASCENT PFT - FEV1 - PREDICTED: 1.88
MGC ASCENT PFT - FVC - POST: 2.11
MGC ASCENT PFT - FVC - PRE: 2.01
MGC ASCENT PFT - FVC - PREDICTED: 2.49

## 2025-05-06 PROCEDURE — 94726 PLETHYSMOGRAPHY LUNG VOLUMES: CPT

## 2025-05-06 PROCEDURE — 2500000001 HC RX 250 WO HCPCS SELF ADMINISTERED DRUGS (ALT 637 FOR MEDICARE OP): Performed by: NURSE PRACTITIONER

## 2025-05-06 PROCEDURE — 94640 AIRWAY INHALATION TREATMENT: CPT

## 2025-05-06 RX ORDER — ALBUTEROL SULFATE 0.83 MG/ML
3 SOLUTION RESPIRATORY (INHALATION) ONCE
Status: COMPLETED | OUTPATIENT
Start: 2025-05-06 | End: 2025-05-06

## 2025-05-06 RX ORDER — ALBUTEROL SULFATE 90 UG/1
4 INHALANT RESPIRATORY (INHALATION) ONCE
Status: COMPLETED | OUTPATIENT
Start: 2025-05-06 | End: 2025-05-06

## 2025-05-06 RX ADMIN — ALBUTEROL SULFATE 4 PUFF: 90 AEROSOL, METERED RESPIRATORY (INHALATION) at 13:57

## 2025-05-12 ENCOUNTER — TELEPHONE (OUTPATIENT)
Dept: PULMONOLOGY | Facility: HOSPITAL | Age: 85
End: 2025-05-12
Payer: MEDICARE

## 2025-05-12 NOTE — TELEPHONE ENCOUNTER
Patient acknowledged understanding. All questions answered at this time.    ----- Message from Karen Garcia sent at 5/9/2025 10:01 AM EDT -----  Please call the patient and let her know that her PFT is improved since last year. She has moderate obstruction (again confirming my suspicion of asthma) last year it was severe obstruction.   Recommend continue current plan of care and Dr. Duarte will go over this further at her appointment next month.  ----- Message -----  From: Interface, Pft Results In  Sent: 5/6/2025   2:18 PM EDT  To: Karen Garcia, WALE-CNP

## 2025-05-19 ENCOUNTER — HOSPITAL ENCOUNTER (OUTPATIENT)
Dept: RADIOLOGY | Facility: HOSPITAL | Age: 85
Discharge: HOME | End: 2025-05-19
Payer: MEDICARE

## 2025-05-19 DIAGNOSIS — R93.89 ABNORMAL CT OF THE CHEST: ICD-10-CM

## 2025-05-19 DIAGNOSIS — R91.1 PULMONARY NODULE: ICD-10-CM

## 2025-05-19 PROCEDURE — 71250 CT THORAX DX C-: CPT

## 2025-05-21 ENCOUNTER — TELEPHONE (OUTPATIENT)
Dept: PULMONOLOGY | Facility: HOSPITAL | Age: 85
End: 2025-05-21

## 2025-05-21 ENCOUNTER — TREATMENT (OUTPATIENT)
Dept: OCCUPATIONAL THERAPY | Facility: HOSPITAL | Age: 85
End: 2025-05-21
Payer: MEDICARE

## 2025-05-21 DIAGNOSIS — I89.0 LYMPHEDEMA: ICD-10-CM

## 2025-05-21 PROCEDURE — 97140 MANUAL THERAPY 1/> REGIONS: CPT | Mod: GO,CO

## 2025-05-21 PROCEDURE — 97535 SELF CARE MNGMENT TRAINING: CPT | Mod: GO,CO

## 2025-05-21 PROCEDURE — 97110 THERAPEUTIC EXERCISES: CPT | Mod: GO,CO

## 2025-05-21 ASSESSMENT — PAIN SCALES - GENERAL: PAINLEVEL_OUTOF10: 10 - WORST POSSIBLE PAIN

## 2025-05-21 ASSESSMENT — PAIN - FUNCTIONAL ASSESSMENT: PAIN_FUNCTIONAL_ASSESSMENT: 0-10

## 2025-05-21 ASSESSMENT — ACTIVITIES OF DAILY LIVING (ADL): HOME_MANAGEMENT_TIME_ENTRY: 15

## 2025-05-21 NOTE — TELEPHONE ENCOUNTER
Patient acknowledged understanding. All questions answered at this time.     ----- Message from Karen Garcia sent at 5/20/2025  3:16 PM EDT -----  Please call the patient know that her CT chest shows lung nodules are stable, we can now get a repeat CT chest in one year for her. Keep follow up as scheduled.   ----- Message -----  From: Interface, Radiology Results In  Sent: 5/20/2025   2:57 PM EDT  To: Karen Garcia, WALE-CNP

## 2025-05-21 NOTE — PROGRESS NOTES
Occupational Therapy    Occupational Therapy Treatment    Name: Jeane Gao  MRN: [id number]  : 1940  Date: 2025  Time Calculation  Start Time: 1120  Stop Time: 1220  Time Calculation (min): 60 min     OT Therapeutic Procedures Time Entry  Manual Therapy Time Entry: 30  Self Care/Home Management (ADLs) Time Entry: 15  Therapeutic Exercise Time Entry: 15                 Visit number: 2    Assessment:  Pts BLEs increased in measurement and pain. P   Pain after treatment 8/10  Plan:  Cont skilled OT to decrease edema and pain via compression, elevation exercises and massage. Improve AROM and balance to improve ease of ADLs, IADls and safe mobility       Subjective Pt states that she is in a lot of pain all the time especially her left ankle.   General:  Pt to start PT for her tenodosis in her left ankle.      Name and  confirmed:yes  Objective    Therapy/Activity:   SELF CARE: Educated pt in the anatomy and physiology as it pertains to tx MLD, compression, elevation and HEP. Pt given size G TG  single layer to wear on BLEs to begin the compression wear process. Pt inst to wear daily and if she chooses to wear at night. Inst pt that she must remove the TG  and inspect her skin daily. Pt verbalized understanding.   MANUAL: MLD, SCF, abdominal deep and superficial, B axilla, B inguinal and BLES. Pt inst in self MLD, types of strokes and sequence. Pt given written handout to follow. Pt will need further instruction.   THERAPEUTIC EXERCISE: Pt educated and inst in diaphragmatic breathing exs and given a handout to follow for seated and supine positions. Inst Pt to perform Diaphragmatic breathing throughout the day.   Lymphedema Assessment       Right Lower Extremity:  R Metatarsal (cm): 24 cm  R Heel Y Angle: 32 cm  R Ankle (cm): 29 cm  R 10 cm Above Ankle (cm): 33 cm  R 20 cm Above Ankle (cm): 44.5 cm  R 30 cm Above Ankle (cm): 46.5 cm  R 40 cm Above Ankle (cm): 0 cm  R Knee (cm): 0 cm  R 10 cm  Above Knee (cm): 0 cm  R 20 cm Above Knee (cm): 0 cm  R 30 cm Above Knee (cm): 0 cm  R 40 cm Above Knee (cm): 0 cm  R 50 cm Above Knee (cm): 0 cm  Right lower extremity total: 209  Left Lower Extremity:  L Metatarsal (cm): 24.5 cm  L Heel Y Angle: 35 cm  L Ankle (cm): 32 cm  L 10 cm Above Ankle (cm): 37 cm  L 20 cm Above Ankle (cm): 49 cm  L 30 cm Above Ankle (cm): 50.4 cm  L 40 cm Above Ankle (cm): 0 cm  L Knee (cm): 0 cm  L 10 cm Above Knee (cm): 0 cm  L 20 cm Above Knee (cm): 0 cm  L 30 cm Above Knee (cm): 0 cm  L 40 cm Above Knee (cm): 0 cm  L 50 cm Above Knee (cm): 0 cm  Left Lower extremity total: 227.9  LE Skin Appearance/Condition and Girth:  Edema - Pitting: yes  Hyperpigmentation: yes  +Stemmer Sign: yes  Other (comment): Pt has tendosis in her right foot with 10/10 pain (Pt is supposed to wear an air cast on her LLE but did not come in with it on)    Pain Assessment:  Pain Assessment: 0-10  0-10 (Numeric) Pain Score: 10 - Worst possible pain  Pain Type: Acute pain  Pain Location: Ankle  Pain Orientation: Left  Clinical Progression: Gradually worsening  Effect of Pain on Daily Activities: decreased ability to ambulating  Therapy Precautions:   Medical Precautions: Lymphedema precautions, Fall precautions  Precautions Comment: Pt has not been in any compression    OP EDUCATION: Pt educated in the anatomy and physiology of the lymph system. Pt inst in diaphragmatic breathing and given a hand out. Pt inst in self MLD and given a written handout. Inst to perform a full series once a day.       Goals:  Active       OT Goals       OT Goal 1       Start:  04/30/25    Expected End:  10/30/25       STG: Pt will be independent with self management, including use of garments, pump if indicated, self MLD, ability to recognize signs of infection/cellulitis and exercise/HEP to minimize risk of progression of symptoms and skin infections/cellulitis.          OT Goal 2       Start:  04/30/25    Expected End:  10/30/25        STG: Decrease girth in bilateral LEs by 20 cm each for improved mobility and better clothing/shoe fit.           OT Goal 3       Start:  04/30/25    Expected End:  10/30/25       LTG: Pt will show improvement on the LLIS impairment scale to no greater than 30% impaired.              OT Problem       PATIENT STATED GOAL decrease size of legs and pain.       Start:  04/30/25    Expected End:  10/30/25       PATIENT STATED GOAL decrease size of legs and pain.

## 2025-05-28 ENCOUNTER — TREATMENT (OUTPATIENT)
Dept: OCCUPATIONAL THERAPY | Facility: HOSPITAL | Age: 85
End: 2025-05-28
Payer: MEDICARE

## 2025-05-28 DIAGNOSIS — I89.0 LYMPHEDEMA: ICD-10-CM

## 2025-05-28 PROCEDURE — 97110 THERAPEUTIC EXERCISES: CPT | Mod: GO,CO

## 2025-05-28 PROCEDURE — 97535 SELF CARE MNGMENT TRAINING: CPT | Mod: GO,CO

## 2025-05-28 ASSESSMENT — PAIN SCALES - GENERAL: PAINLEVEL_OUTOF10: 5 - MODERATE PAIN

## 2025-05-28 ASSESSMENT — ACTIVITIES OF DAILY LIVING (ADL)
EFFECT OF PAIN ON DAILY ACTIVITIES: DECREASED MOBILITY
HOME_MANAGEMENT_TIME_ENTRY: 30

## 2025-05-28 ASSESSMENT — PAIN - FUNCTIONAL ASSESSMENT: PAIN_FUNCTIONAL_ASSESSMENT: 0-10

## 2025-05-28 NOTE — PROGRESS NOTES
Occupational Therapy    Occupational Therapy Treatment    Name: Jeane Gao  MRN: [id number]  : 1940  Date: 2025  Time Calculation  Start Time: 1115  Stop Time: 1200  Time Calculation (min): 45 min     OT Therapeutic Procedures Time Entry  Self Care/Home Management (ADLs) Time Entry: 30  Therapeutic Exercise Time Entry: 15                 Visit number: 3    Assessment:     Pain after treatment 5/10 left ankle only. Pt is having PT tx 3x week for her left ankle tenodosis. Pt has been set up for a vaso pneumatic pump trial on 2025  Plan: Cont skilled OT to decrease edema and pain via compression, elevation exercises and massage. Improve AROM and balance to improve ease of ADLs, IADLs and safe mobility.       Subjective Pt with many questions which have been answered in previous tx. Question answered to her satisfaction.   General: Pt 15 min late for her appt.       Name and  confirmed: yes  Objective    Therapy/Activity:   SELF CARE: Pt decreased in size to size F TG . Inst pt to wear this TG  daily, put on in the am and remove at bedtime.   MANUAL:   THERAPEUTIC EXERCISE: Reviewed LE HEP with Pt. Toe curls, ankle pumps circles both ways, int ext rotation and encouraged walking to reduce lymphedema. Inst pt to wear her compression when she does her exs. Pt verbalized understanding.   Lymphedema Assessment       Right Lower Extremity:  R Metatarsal (cm): 23 cm  R Heel Y Angle: 31.5 cm  R Ankle (cm): 26.3 cm  R 10 cm Above Ankle (cm): 31.2 cm  R 20 cm Above Ankle (cm): 42.5 cm  R 30 cm Above Ankle (cm): 44.5 cm  R 40 cm Above Ankle (cm): 0 cm  R Knee (cm): 0 cm  R 10 cm Above Knee (cm): 0 cm  R 20 cm Above Knee (cm): 0 cm  R 30 cm Above Knee (cm): 0 cm  R 40 cm Above Knee (cm): 0 cm  R 50 cm Above Knee (cm): 0 cm  Right lower extremity total: 199  Left Lower Extremity:  L Metatarsal (cm): 23.3 cm  L Heel Y Angle: 33 cm  L Ankle (cm): 29.8 cm  L 10 cm Above Ankle (cm): 35 cm  L 20 cm Above  Ankle (cm): 45.5 cm  L 30 cm Above Ankle (cm): 46.8 cm  L 40 cm Above Ankle (cm): 0 cm  L Knee (cm): 0 cm  L 10 cm Above Knee (cm): 0 cm  L 20 cm Above Knee (cm): 0 cm  L 30 cm Above Knee (cm): 0 cm  L 40 cm Above Knee (cm): 0 cm  L 50 cm Above Knee (cm): 0 cm  Left Lower extremity total: 213.4  LE Skin Appearance/Condition and Girth:  Edema - Pitting: yes  Hyperpigmentation: yes  +Stemmer Sign: yes  Other (comment): tops of feet are visibly reduced    Pain Assessment:  Pain Assessment: 0-10  0-10 (Numeric) Pain Score: 5 - Moderate pain  Pain Type: Acute pain  Pain Location: Ankle  Pain Orientation: Left  Effect of Pain on Daily Activities: decreased mobility  Therapy Precautions:   Medical Precautions: Lymphedema precautions  Precautions Comment: Pt is a fall risk    OP EDUCATION: Educated pt to cont with her routine at home. Inst pt to wear size F TG  daily, all day.        Goals:  Active       OT Goals       OT Goal 1       Start:  04/30/25    Expected End:  10/30/25       STG: Pt will be independent with self management, including use of garments, pump if indicated, self MLD, ability to recognize signs of infection/cellulitis and exercise/HEP to minimize risk of progression of symptoms and skin infections/cellulitis.          OT Goal 2       Start:  04/30/25    Expected End:  10/30/25       STG: Decrease girth in bilateral LEs by 20 cm each for improved mobility and better clothing/shoe fit.           OT Goal 3       Start:  04/30/25    Expected End:  10/30/25       LTG: Pt will show improvement on the LLIS impairment scale to no greater than 30% impaired.              OT Problem       PATIENT STATED GOAL decrease size of legs and pain.       Start:  04/30/25    Expected End:  10/30/25       PATIENT STATED GOAL decrease size of legs and pain.

## 2025-06-04 ENCOUNTER — APPOINTMENT (OUTPATIENT)
Dept: RADIOLOGY | Facility: HOSPITAL | Age: 85
End: 2025-06-04
Payer: MEDICARE

## 2025-06-04 ENCOUNTER — TREATMENT (OUTPATIENT)
Dept: OCCUPATIONAL THERAPY | Facility: HOSPITAL | Age: 85
End: 2025-06-04
Payer: MEDICARE

## 2025-06-04 ENCOUNTER — HOSPITAL ENCOUNTER (EMERGENCY)
Facility: HOSPITAL | Age: 85
Discharge: HOME | End: 2025-06-04
Payer: MEDICARE

## 2025-06-04 VITALS
HEIGHT: 66 IN | OXYGEN SATURATION: 99 % | SYSTOLIC BLOOD PRESSURE: 127 MMHG | DIASTOLIC BLOOD PRESSURE: 63 MMHG | BODY MASS INDEX: 33.11 KG/M2 | HEART RATE: 84 BPM | TEMPERATURE: 96.8 F | RESPIRATION RATE: 16 BRPM | WEIGHT: 206 LBS

## 2025-06-04 DIAGNOSIS — M79.89 LEG SWELLING: Primary | ICD-10-CM

## 2025-06-04 DIAGNOSIS — I89.0 LYMPHEDEMA: ICD-10-CM

## 2025-06-04 PROCEDURE — 73700 CT LOWER EXTREMITY W/O DYE: CPT | Mod: LEFT SIDE | Performed by: STUDENT IN AN ORGANIZED HEALTH CARE EDUCATION/TRAINING PROGRAM

## 2025-06-04 PROCEDURE — 97110 THERAPEUTIC EXERCISES: CPT | Mod: GO,CO

## 2025-06-04 PROCEDURE — 73590 X-RAY EXAM OF LOWER LEG: CPT | Mod: LT

## 2025-06-04 PROCEDURE — 73630 X-RAY EXAM OF FOOT: CPT | Mod: LEFT SIDE | Performed by: STUDENT IN AN ORGANIZED HEALTH CARE EDUCATION/TRAINING PROGRAM

## 2025-06-04 PROCEDURE — 97535 SELF CARE MNGMENT TRAINING: CPT | Mod: GO,CO

## 2025-06-04 PROCEDURE — 73610 X-RAY EXAM OF ANKLE: CPT | Mod: LT

## 2025-06-04 PROCEDURE — 73564 X-RAY EXAM KNEE 4 OR MORE: CPT | Mod: LEFT SIDE | Performed by: STUDENT IN AN ORGANIZED HEALTH CARE EDUCATION/TRAINING PROGRAM

## 2025-06-04 PROCEDURE — 99285 EMERGENCY DEPT VISIT HI MDM: CPT | Mod: 25

## 2025-06-04 PROCEDURE — 73564 X-RAY EXAM KNEE 4 OR MORE: CPT | Mod: LT

## 2025-06-04 PROCEDURE — 73700 CT LOWER EXTREMITY W/O DYE: CPT | Mod: LT

## 2025-06-04 PROCEDURE — 73630 X-RAY EXAM OF FOOT: CPT | Mod: LT

## 2025-06-04 PROCEDURE — 97140 MANUAL THERAPY 1/> REGIONS: CPT | Mod: GO,CO

## 2025-06-04 PROCEDURE — 93971 EXTREMITY STUDY: CPT

## 2025-06-04 PROCEDURE — 93971 EXTREMITY STUDY: CPT | Performed by: STUDENT IN AN ORGANIZED HEALTH CARE EDUCATION/TRAINING PROGRAM

## 2025-06-04 PROCEDURE — 73610 X-RAY EXAM OF ANKLE: CPT | Mod: LEFT SIDE | Performed by: STUDENT IN AN ORGANIZED HEALTH CARE EDUCATION/TRAINING PROGRAM

## 2025-06-04 PROCEDURE — 73590 X-RAY EXAM OF LOWER LEG: CPT | Mod: LEFT SIDE | Performed by: STUDENT IN AN ORGANIZED HEALTH CARE EDUCATION/TRAINING PROGRAM

## 2025-06-04 ASSESSMENT — PAIN - FUNCTIONAL ASSESSMENT
PAIN_FUNCTIONAL_ASSESSMENT: 0-10
PAIN_FUNCTIONAL_ASSESSMENT: 0-10

## 2025-06-04 ASSESSMENT — PAIN SCALES - GENERAL
PAINLEVEL_OUTOF10: 8
PAINLEVEL_OUTOF10: 6

## 2025-06-04 ASSESSMENT — PAIN DESCRIPTION - LOCATION: LOCATION: LEG

## 2025-06-04 ASSESSMENT — COLUMBIA-SUICIDE SEVERITY RATING SCALE - C-SSRS
2. HAVE YOU ACTUALLY HAD ANY THOUGHTS OF KILLING YOURSELF?: NO
6. HAVE YOU EVER DONE ANYTHING, STARTED TO DO ANYTHING, OR PREPARED TO DO ANYTHING TO END YOUR LIFE?: NO
1. IN THE PAST MONTH, HAVE YOU WISHED YOU WERE DEAD OR WISHED YOU COULD GO TO SLEEP AND NOT WAKE UP?: NO

## 2025-06-04 ASSESSMENT — LIFESTYLE VARIABLES
EVER FELT BAD OR GUILTY ABOUT YOUR DRINKING: NO
TOTAL SCORE: 0
HAVE YOU EVER FELT YOU SHOULD CUT DOWN ON YOUR DRINKING: NO
HAVE PEOPLE ANNOYED YOU BY CRITICIZING YOUR DRINKING: NO
EVER FELT BAD OR GUILTY ABOUT YOUR DRINKING: NO
TOTAL SCORE: 0
EVER HAD A DRINK FIRST THING IN THE MORNING TO STEADY YOUR NERVES TO GET RID OF A HANGOVER: NO
HAVE YOU EVER FELT YOU SHOULD CUT DOWN ON YOUR DRINKING: NO
EVER HAD A DRINK FIRST THING IN THE MORNING TO STEADY YOUR NERVES TO GET RID OF A HANGOVER: NO
HAVE PEOPLE ANNOYED YOU BY CRITICIZING YOUR DRINKING: NO

## 2025-06-04 ASSESSMENT — PAIN DESCRIPTION - PAIN TYPE: TYPE: ACUTE PAIN

## 2025-06-04 ASSESSMENT — ACTIVITIES OF DAILY LIVING (ADL): HOME_MANAGEMENT_TIME_ENTRY: 30

## 2025-06-04 ASSESSMENT — PAIN DESCRIPTION - ORIENTATION: ORIENTATION: LEFT

## 2025-06-04 NOTE — ED TRIAGE NOTES
Pt presents for left leg swelling. She had therapy today and they were concerned that she may have a blood clot. She had a mechanical fall the other day which made her lymphedema worse on her left leg. She does take thinners (Plavix). No LOC. Denies hitting her head, She states that the tripped on a rub she was putting down. Has ecchymosis to the left leg. Ambulatory with a steady gait.

## 2025-06-04 NOTE — ED PROVIDER NOTES
HPI   Chief Complaint   Patient presents with    Leg Swelling       84-year-old female with a past history of bilateral lower extremity lymphedema, hypertension, A-fib on thinners, CAD status post CABG 2010, hiatal hernia, hypothyroidism presents to the emergency department today for a fall with lower extremity swelling and ecchymosis.  Patient states that a few days ago she tripped over a rug and fell onto her left knee.  She did not hit her head and did not lose consciousness.  States that the swelling is worsened and she is pain with ambulation.  Saw her physical therapist today who recommended she be evaluated for a possible underlying blood clot versus fracture.  Patient denies any numbness or tingling extremity.  No further pain or injury.  She has not been taking any over-the-counter medications.                          Ravinder Coma Scale Score: 15                  Patient History   Medical History[1]  Surgical History[2]  Family History[3]  Social History[4]    Physical Exam   ED Triage Vitals [06/04/25 1420]   Temperature Heart Rate Respirations BP   36 °C (96.8 °F) 84 16 127/63      Pulse Ox Temp Source Heart Rate Source Patient Position   99 % Skin -- --      BP Location FiO2 (%)     -- --       Physical Exam  Vitals and nursing note reviewed.   Constitutional:       General: She is not in acute distress.     Appearance: Normal appearance. She is not toxic-appearing.   HENT:      Right Ear: Tympanic membrane normal.      Left Ear: Tympanic membrane normal.      Mouth/Throat:      Mouth: Mucous membranes are moist.      Pharynx: Oropharynx is clear.   Eyes:      Extraocular Movements: Extraocular movements intact.      Pupils: Pupils are equal, round, and reactive to light.   Cardiovascular:      Rate and Rhythm: Normal rate and regular rhythm.      Pulses: Normal pulses.      Heart sounds: Normal heart sounds.   Pulmonary:      Effort: Pulmonary effort is normal.      Breath sounds: Normal breath sounds.    Abdominal:      General: Abdomen is flat. Bowel sounds are normal.      Palpations: Abdomen is soft.      Tenderness: There is no abdominal tenderness.   Musculoskeletal:         General: Normal range of motion.      Cervical back: Normal range of motion and neck supple.      Right lower leg: Edema present.      Left lower leg: Edema present.      Comments: Bilateral lower extremity edema.  Right lower extremity is at patient's baseline per the patient.  Left lower extremity is more swollen than right.  She has ecchymosis from the knee to her ankle.  Pain on palpation of the knee, tib-fib, ankle and foot.  She has full active and passive range of motion of all joints.  She has no hip pain or discomfort   Skin:     General: Skin is warm and dry.      Capillary Refill: Capillary refill takes less than 2 seconds.   Neurological:      General: No focal deficit present.      Mental Status: She is alert and oriented to person, place, and time.   Psychiatric:         Mood and Affect: Mood normal.         Behavior: Behavior normal.         Judgment: Judgment normal.         Labs Reviewed - No data to display  Pain Management Panel           No data to display              CT ankle left wo IV contrast   Final Result   No acute osseous abnormality left ankle, specifically no fracture of   the lateral malleolus. No evidence of osteomyelitis.        Multiple potential etiologies of atraumatic left ankle pain as   described in detail above, depending on location and clinical context.             MACRO:   None.        Signed by: Paul Maciel 6/4/2025 7:10 PM   Dictation workstation:   [id number]      Vascular US Lower Extremity Venous Duplex Left   Final Result        Negative study.  No deep venous thrombosis of the left lower   extremity.        MACRO:   None        Signed by: Sandrine Rust 6/4/2025 4:39 PM   Dictation workstation:   [id number]      XR knee left 4+ views   Final Result        No acute osseous  abnormality.        MACRO        None        Signed by: Sandrine Rust 6/4/2025 4:38 PM   Dictation workstation:   [id number]      XR tibia fibula left 2 views   Final Result        Indistinctness of the lateral cortex of the lateral malleolus with   overlying soft tissue swelling could represent a nondisplaced   fracture, however nonspecific; correlation with CT may be warranted.        No fracture in the tibia/fibula or foot.        Diffuse calf, ankle and dorsal forefoot soft tissue swelling.        MACRO        Sandrine Rust discussed the significance and urgency of this   critical finding by EPIC secure chat with  ARELY GREEN on   6/4/2025 at 4:47 pm.  (**-RCF-**) Findings:  See findings.        Signed by: Sandrine Rust 6/4/2025 4:48 PM   Dictation workstation:   [id number]      XR ankle left 3+ views   Final Result        Indistinctness of the lateral cortex of the lateral malleolus with   overlying soft tissue swelling could represent a nondisplaced   fracture, however nonspecific; correlation with CT may be warranted.        No fracture in the tibia/fibula or foot.        Diffuse calf, ankle and dorsal forefoot soft tissue swelling.        MACRO        Sandrine Rust discussed the significance and urgency of this   critical finding by EPIC secure chat with  ARELY GREEN on   6/4/2025 at 4:47 pm.  (**-RCF-**) Findings:  See findings.        Signed by: Sandrine Rust 6/4/2025 4:48 PM   Dictation workstation:   [id number]      XR foot left 3+ views   Final Result        Indistinctness of the lateral cortex of the lateral malleolus with   overlying soft tissue swelling could represent a nondisplaced   fracture, however nonspecific; correlation with CT may be warranted.        No fracture in the tibia/fibula or foot.        Diffuse calf, ankle and dorsal forefoot soft tissue swelling.        MACRO        Sandrine Rust discussed the significance and urgency of this   critical finding by EPIC  secure chat with  ARELY GREEN on   6/4/2025 at 4:47 pm.  (**-RCF-**) Findings:  See findings.        Signed by: Sandrine Rust 6/4/2025 4:48 PM   Dictation workstation:   [id number]          ED Course & [facility]   ED Course as of 06/04/25 1947 Wed Jun 04, 2025   1654 X-ray shows concern for possible lateral mall abnormality therefore CT imaging ordered. [RB]      ED Course User Index  [RB] Arely Green, APRN-CNP         Diagnoses as of 06/04/25 1947   Leg swelling       Medical Decision Making  On initial valuation patient is well-appearing the emergency department at this time.  I did discuss CT imaging of the head and C-spine however patient adamantly refuses at this time.  She is aware of the risks of this.  States she feels fine.  She is neurologically intact and has been a few days since the fall.  Ultrasound shows no DVT of the lower extremity x-ray shows no acute osteotomy of the knee, tib-fib or foot.  Lateral cortex lateral malleolus could represent a nondisplaced fracture therefore CT imaging was obtained.  CT imaging no acute osseous abnormality of the left ankle.  When we went to reevaluate the patient she was not in the waiting room.  I was able to get a hold of the patient on the phone I discussed results with her in depth.  She does have close outpatient follow-up educated on how to return precautions and rest ice compression elevation she verbalized understanding to be discharged in stable condition.        Procedure  Procedures      Differential Diagnoses include dvt, hemoatoma, fracture  This is not an exhaustive list of all the diagnosis and therapeutics are considered during the patient's evaluation for an emergency medical condition.    I discussed the differential, results and discharge plan with the patient and/or family/friend/caregiver if present.  I emphasized the importance of follow-up with the physician I referred them to in the timeframe recommended.  I explained reasons for  the patient to return to the Emergency Department. Additional verbal discharge instructions were also given and discussed with the patient to supplement those generated by the EMR. We also discussed medications that were prescribed (if any) including common side effects and interactions. The patient was advised to abstain from driving, operating heavy machinery or making significant decisions while taking medications such as opiates and muscle relaxers that may impair this. All questions were addressed.  They understand return precautions and discharge instructions. The patient and/or family/friend/caregiver expressed understanding.             [1]   Past Medical History:  Diagnosis Date    COPD (chronic obstructive pulmonary disease) (Multi)     Coronary artery disease     Hyperlipidemia     Hypertension     Hypothyroidism     Tietze's disease    [2]   Past Surgical History:  Procedure Laterality Date    CORONARY ARTERY BYPASS GRAFT  2010    x3   [3] No family history on file.  [4]   Social History  Tobacco Use    Smoking status: Former     Current packs/day: 0.00     Average packs/day: 0.3 packs/day for 10.0 years (2.5 ttl pk-yrs)     Types: Cigarettes     Quit date:      Years since quittin.4    Smokeless tobacco: Never   Substance Use Topics    Alcohol use: Not Currently    Drug use: Never        Ioana Zuniga, WALE-CNP  [phone]

## 2025-06-04 NOTE — PROGRESS NOTES
Occupational Therapy    Occupational Therapy Treatment    Name: Jeane Gao  MRN: [id number]  : 1940  Date: 2025  Time Calculation  Start Time: 1105  Stop Time: 1205  Time Calculation (min): 60 min     OT Therapeutic Procedures Time Entry  Manual Therapy Time Entry: 15  Self Care/Home Management (ADLs) Time Entry: 30  Therapeutic Exercise Time Entry: 15                 Visit number: 4    Assessment:  Pts measurements increased in RUE due to injury from a fall by 10.2 cm. LLE decreased  by 3.2 cm.  Pt strongly encouraged to go to the ER to get LLE looked at. Pt states she will.   Pain after treatment 8/10 to walk.  Plan:     Cont skilled OT to decrease edema and pain via compression, elevation exercises and massage. Improve AROM and balance to improve ease of ADLs, IADLS and transfers.    Subjective Pt limping today. Pt states she fell and hurt her LLE and its very painful to walk on. Conferred with OTR/L regarding pts fall.   General:  Pt LLE knee and below with bruising, swelling heat to the touch and skin is reddened. Strongly recommended pt go to the ER.     Name and  confirmed:yes  Objective    Therapy/Activity:   SELF CARE: Pt received her garments however di not bring them with her. Inst pt not to attempt to wear them until after she goes to the ER. Pt verbalized understanding.   MANUAL: Modified MLD, did not work on LLE due to her injuries.   THERAPEUTIC EXERCISE: Inst pt to hold her HEP until after she gets seen by a medical professional, ER or [facility] center or her Dr. Encouraged pt to do this TODAY.  Lymphedema Assessment       Right Lower Extremity:  R Metatarsal (cm): 23 cm  R Heel Y Angle: 32 cm  R Ankle (cm): 26.2 cm  R 10 cm Above Ankle (cm): 30.2 cm  R 20 cm Above Ankle (cm): 40 cm  R 30 cm Above Ankle (cm): 44.4 cm  R 40 cm Above Ankle (cm): 0 cm  R Knee (cm): 0 cm  R 10 cm Above Knee (cm): 0 cm  R 20 cm Above Knee (cm): 0 cm  R 30 cm Above Knee (cm): 0 cm  R 40 cm Above Knee (cm): 0 cm  R  50 cm Above Knee (cm): 0 cm  Right lower extremity total: 195.8  Left Lower Extremity:  L Metatarsal (cm): 24 cm  L Heel Y Angle: 33.4 cm  L Ankle (cm): 31.3 cm  L 10 cm Above Ankle (cm): 36.5 cm  L 20 cm Above Ankle (cm): 48 cm  L 30 cm Above Ankle (cm): 50.4 cm  L 40 cm Above Ankle (cm): 0 cm  L Knee (cm): 0 cm  L 10 cm Above Knee (cm): 0 cm  L 20 cm Above Knee (cm): 0 cm  L 30 cm Above Knee (cm): 0 cm  L 40 cm Above Knee (cm): 0 cm  L 50 cm Above Knee (cm): 0 cm  Left Lower extremity total: 223.6  LE Skin Appearance/Condition and Girth:  Edema - Pitting: yes (increased due to bruising and swelling  from fall)  Hyperpigmentation: yes  +Stemmer Sign: yes  Other (comment): LLE is increasinglyt swollen. (Pt fell and her LLE is swollen and bruised, warm to the touch and tender to the touch)    Pain Assessment:  Pain Assessment: 0-10  0-10 (Numeric) Pain Score: 8 (pt cannot tolerate firm touchj)  Pain Type: Acute pain  Pain Location: Leg  Pain Orientation: Left  Clinical Progression: Rapidly worsening  Effect of Pain on Daily Activities: decreased mo bility (pt had a fall 2 days ago)  Therapy Precautions:   Medical Precautions: Lymphedema precautions  Precautions Comment: Pt fell, she cont to be a high fall risk (Pt wears her shoes with the heel pushed down due to lymphedema. Inst pt last week and this week that she should not do this.Recommended pt wear anti skid socks and not shoes that fall off her feet.)    OP EDUCATION: Told pt multiple times throughout tx that she needs to go to the ER because of the injury to her LLE from a fall 2 days ago. Pt stated that she would.        Goals:  Active       OT Goals       OT Goal 1       Start:  04/30/25    Expected End:  10/30/25       STG: Pt will be independent with self management, including use of garments, pump if indicated, self MLD, ability to recognize signs of infection/cellulitis and exercise/HEP to minimize risk of progression of symptoms and skin  infections/cellulitis.          OT Goal 2       Start:  04/30/25    Expected End:  10/30/25       STG: Decrease girth in bilateral LEs by 20 cm each for improved mobility and better clothing/shoe fit.           OT Goal 3       Start:  04/30/25    Expected End:  10/30/25       LTG: Pt will show improvement on the LLIS impairment scale to no greater than 30% impaired.              OT Problem       PATIENT STATED GOAL decrease size of legs and pain.       Start:  04/30/25    Expected End:  10/30/25       PATIENT STATED GOAL decrease size of legs and pain.

## 2025-06-11 ENCOUNTER — TREATMENT (OUTPATIENT)
Dept: OCCUPATIONAL THERAPY | Facility: HOSPITAL | Age: 85
End: 2025-06-11
Payer: MEDICARE

## 2025-06-11 DIAGNOSIS — I89.0 LYMPHEDEMA: ICD-10-CM

## 2025-06-11 PROCEDURE — 97535 SELF CARE MNGMENT TRAINING: CPT | Mod: GO,CO

## 2025-06-11 PROCEDURE — 97140 MANUAL THERAPY 1/> REGIONS: CPT | Mod: GO,CO

## 2025-06-11 ASSESSMENT — ACTIVITIES OF DAILY LIVING (ADL)
HOME_MANAGEMENT_TIME_ENTRY: 30
EFFECT OF PAIN ON DAILY ACTIVITIES: DECREASED MOBILITY

## 2025-06-11 ASSESSMENT — PAIN SCALES - GENERAL: PAINLEVEL_OUTOF10: 7

## 2025-06-11 ASSESSMENT — PAIN - FUNCTIONAL ASSESSMENT: PAIN_FUNCTIONAL_ASSESSMENT: 0-10

## 2025-06-11 NOTE — PROGRESS NOTES
Occupational Therapy    Occupational Therapy Treatment    Name: Jeane Gao  MRN: [id number]  : 1940  Date: 2025  Time Calculation  Start Time: 1100  Stop Time: 1200  Time Calculation (min): 60 min     OT Therapeutic Procedures Time Entry  Self Care/Home Management (ADLs) Time Entry: 30  Manual Therapy Time Entry: 30                 Visit number: 5    Assessment:  Pt test results are negative. LLE is less bruised and less painful. Pt received her garments and was inst in how to don and adjust for tension of straps. Pt has been in compression, performing his HEP self MLD and elevating his LEs for 4 weeks. lymphedema is a chronic, progressive, regional disease affecting the soft tissue skin lymphatic vessels and nodes from protein rich fluid caused by mechanical failure. Pt would greatly benefit from a pneumatic pump in a home program to further reduce/ maintain her lymphedema.  Pain after treatment 7/10  Plan:  Cont skilled OT to decrease edema and pain via compression, elevation exercises and massage. Improve AROM and balance to improve ease of ADLs, IADLs and safe mobility.        Subjective  Pt states that she went to the ER and had an X-ray, an US and A CAT scan. All results are negative.   General:  LLE remains bruised and swollen. Less painful but pain is 7/10.      Name and  confirmed: yes  Objective    Therapy/Activity:   SELF CARE: Pt inst in wear care of vaso pneumatic pump and sleeves. Pt. Given written pamphlet and instructions for use. Pt inst in how to don her B inelastic wraps with correct tension for the straps. Pts LLE is very tender so tension is not as aggressive and pt is wearing a TG  vs the light compressive sock on the LLE due to her pain .   MANUAL: 30 min trial with a vaso pneumatic pump at 45 mmHg.    THERAPEUTIC EXERCISE:   Lymphedema Assessment       Right Lower Extremity:  R Metatarsal (cm): 23 cm  R Heel Y Angle: 30.9 cm  R Ankle (cm): 27 cm  R 10 cm Above Ankle (cm):  29.8 cm  R 20 cm Above Ankle (cm): 40.6 cm  R 30 cm Above Ankle (cm): 44.4 cm  R 40 cm Above Ankle (cm): 0 cm  R Knee (cm): 0 cm  R 10 cm Above Knee (cm): 0 cm  R 20 cm Above Knee (cm): 0 cm  R 30 cm Above Knee (cm): 0 cm  R 40 cm Above Knee (cm): 0 cm  R 50 cm Above Knee (cm): 0 cm  Right lower extremity total: 195.7  Left Lower Extremity:  L Metatarsal (cm): 22.5 cm  L Heel Y Angle: 32 cm  L Ankle (cm): 30 cm  L 10 cm Above Ankle (cm): 36.5 cm  L 20 cm Above Ankle (cm): 48 cm  L 30 cm Above Ankle (cm): 48.2 cm  L 40 cm Above Ankle (cm): 0 cm  L Knee (cm): 0 cm  L 10 cm Above Knee (cm): 0 cm  L 20 cm Above Knee (cm): 0 cm  L 30 cm Above Knee (cm): 0 cm  L 40 cm Above Knee (cm): 0 cm  L 50 cm Above Knee (cm): 0 cm  Left Lower extremity total: 217.2  LE Skin Appearance/Condition and Girth:  Edema - Pitting: yes  Hyperpigmentation: yes  +Stemmer Sign: yes  Other (comment): pt had an xray, an US and a CAT scan (results are negative)    Pain Assessment:  Pain Assessment: 0-10  0-10 (Numeric) Pain Score: 7  Pain Type: Acute pain  Pain Location: Leg  Pain Orientation: Left  Clinical Progression: Gradually improving  Effect of Pain on Daily Activities: Decreased mobility  Therapy Precautions:   Medical Precautions: Lymphedema precautions  Precautions Comment: Pt had testing with negative results    OP EDUCATION: Educated pt in care, wear and contraindications of using the vaso pneumatic pump.  Educated Pt in donning of B inelastic calf wraps and B AFWs.       Goals:  Active       OT Goals       OT Goal 1       Start:  04/30/25    Expected End:  10/30/25       STG: Pt will be independent with self management, including use of garments, pump if indicated, self MLD, ability to recognize signs of infection/cellulitis and exercise/HEP to minimize risk of progression of symptoms and skin infections/cellulitis.          OT Goal 2       Start:  04/30/25    Expected End:  10/30/25       STG: Decrease girth in bilateral LEs by 20 cm  each for improved mobility and better clothing/shoe fit.           OT Goal 3       Start:  04/30/25    Expected End:  10/30/25       LTG: Pt will show improvement on the LLIS impairment scale to no greater than 30% impaired.              OT Problem       PATIENT STATED GOAL decrease size of legs and pain.       Start:  04/30/25    Expected End:  10/30/25       PATIENT STATED GOAL decrease size of legs and pain.

## 2025-06-18 ENCOUNTER — TREATMENT (OUTPATIENT)
Dept: OCCUPATIONAL THERAPY | Facility: HOSPITAL | Age: 85
End: 2025-06-18
Payer: MEDICARE

## 2025-06-18 DIAGNOSIS — I89.0 LYMPHEDEMA: Primary | ICD-10-CM

## 2025-06-18 PROCEDURE — 97535 SELF CARE MNGMENT TRAINING: CPT | Mod: GO | Performed by: OCCUPATIONAL THERAPIST

## 2025-06-18 ASSESSMENT — ACTIVITIES OF DAILY LIVING (ADL): HOME_MANAGEMENT_TIME_ENTRY: 47

## 2025-06-18 NOTE — PROGRESS NOTES
Occupational Therapy    Occupational Therapy Treatment    Name: Jeane Gao  MRN: [id number]  : 1940  Date: 2025  Time Calculation  Start Time: 1100  Stop Time: 1147  Time Calculation (min): 47 min     OT Therapeutic Procedures Time Entry  Self Care/Home Management (ADLs) Time Entry: 47     Visit number: 6    Assessment:     Pain after treatment no change.  Plan:    Continue one time a week to monitor girth and pump when received.      Subjective   General:    Pt states she has difficulty getting on garments.  She brought them with her but does not have them on.    Name and  confirmed  Objective    Therapy/Activity:   SELF CARE: Reviewed home management.  Reviewed education on the lymphatic system, lymphedema and treatment principles.  Retrograde massage for lower legs, especially ankle/feet due to pitting.   Demonstrated to pt how to don inelastic wraps.  Showed how to use a stool to make reaching easier and a sock aide for the liners.  Pt demonstrated back ability to use items. Sustained bending over is difficult and will give pt a cramp.  Pt initially was applying the straps to the wrong surface.  Also was unclear how to don AFW.  With demonstration and min A pt able to don correctly.  Pt instructed to wear the garments in next time so we can see how she does.    Lymphedema Assessment       Right Lower Extremity:  R Metatarsal (cm): 23 cm  R Heel Y Angle: 30.5 cm  R Ankle (cm): 29 cm  R 10 cm Above Ankle (cm): 29 cm  R 20 cm Above Ankle (cm): 40.5 cm  R 30 cm Above Ankle (cm): 43.5 cm  R 40 cm Above Ankle (cm): 0 cm  R Knee (cm): 0 cm  R 10 cm Above Knee (cm): 0 cm  R 20 cm Above Knee (cm): 0 cm  R 30 cm Above Knee (cm): 0 cm  R 40 cm Above Knee (cm): 0 cm  R 50 cm Above Knee (cm): 0 cm  Right lower extremity total: 195.5  Left Lower Extremity:  L Metatarsal (cm): 22.5 cm  L Heel Y Angle: 30 cm  L Ankle (cm): 29.5 cm  L 10 cm Above Ankle (cm): 31 cm  L 20 cm Above Ankle (cm): 41.5 cm  L 30 cm Above  Ankle (cm): 44.5 cm  L 40 cm Above Ankle (cm): 0 cm  L Knee (cm): 0 cm  L 10 cm Above Knee (cm): 0 cm  L 20 cm Above Knee (cm): 0 cm  L 30 cm Above Knee (cm): 0 cm  L 40 cm Above Knee (cm): 0 cm  L 50 cm Above Knee (cm): 0 cm  Left Lower extremity total: 199  LE Skin Appearance/Condition and Girth:  Edema - Pitting: yes left  Hyperpigmentation: yes  +Stemmer Sign: yes  Other (comment): bruise left    Pain Assessment:  4/10 left leg   Therapy Precautions:   Standard lymphedema precautions    OP EDUCATION:  Use of garments     Goals:  Active       OT Goals       OT Goal 1       Start:  04/30/25    Expected End:  10/30/25       STG: Pt will be independent with self management, including use of garments, pump if indicated, self MLD, ability to recognize signs of infection/cellulitis and exercise/HEP to minimize risk of progression of symptoms and skin infections/cellulitis.          OT Goal 2       Start:  04/30/25    Expected End:  10/30/25       STG: Decrease girth in bilateral LEs by 20 cm each for improved mobility and better clothing/shoe fit.           OT Goal 3       Start:  04/30/25    Expected End:  10/30/25       LTG: Pt will show improvement on the LLIS impairment scale to no greater than 30% impaired.              OT Problem       PATIENT STATED GOAL decrease size of legs and pain.       Start:  04/30/25    Expected End:  10/30/25       PATIENT STATED GOAL decrease size of legs and pain.

## 2025-06-25 ENCOUNTER — TREATMENT (OUTPATIENT)
Dept: OCCUPATIONAL THERAPY | Facility: HOSPITAL | Age: 85
End: 2025-06-25
Payer: MEDICARE

## 2025-06-25 DIAGNOSIS — I89.0 LYMPHEDEMA: ICD-10-CM

## 2025-06-25 PROCEDURE — 97140 MANUAL THERAPY 1/> REGIONS: CPT | Mod: GO,CO

## 2025-06-25 PROCEDURE — 97535 SELF CARE MNGMENT TRAINING: CPT | Mod: GO,CO

## 2025-06-25 ASSESSMENT — PAIN - FUNCTIONAL ASSESSMENT: PAIN_FUNCTIONAL_ASSESSMENT: 0-10

## 2025-06-25 ASSESSMENT — PAIN SCALES - GENERAL: PAINLEVEL_OUTOF10: 4

## 2025-06-25 ASSESSMENT — ACTIVITIES OF DAILY LIVING (ADL)
HOME_MANAGEMENT_TIME_ENTRY: 15
EFFECT OF PAIN ON DAILY ACTIVITIES: DECREASED MOBILITY

## 2025-06-25 NOTE — PROGRESS NOTES
"Occupational Therapy    Occupational Therapy    Occupational Therapy Treatment    Name: Jeane Gao  MRN: [id number]  : 1940  Date: 2025  Time Calculation  Start Time: 1115  Stop Time: 1200  Time Calculation (min): 45 min     OT Therapeutic Procedures Time Entry  Self Care/Home Management (ADLs) Time Entry: 15  Manual Therapy Time Entry: 30                 Visit number: 7    Assessment: Pts BLEs increased in measure. The weather has been very humid and high temperatures. Pt states she was \"running around a lot\" yesterday. More than normal. Pt also gets frustrated with donning both inelastic wraps. Pt inst to wear Both BLE inelastic wraps for most effective reduction. Pt verbalized understanding.   Pain after treatment 4/10  Plan:  Cont skilled OT to decrease edema and pain via compression, elevation exercises and massage. Improve AROM and balance to improve ease of ADLs, IADLs and safe mobility.       Subjective Pt wore only the LLE calf wrap in today.   General:  Pt arrived 15 min late for her appt.      Name and  confirmed: yes  Objective    Therapy/Activity:   SELF CARE: Pt demonstrated to this therapist how she dons her LLE. Pt is able to reach her foot however struggles with the strapping. Reviewed the placement and tension for the straps to be effective in reduction.   MANUAL: Modified MLD SCF, B inguinal and BLEs.   THERAPEUTIC EXERCISE:   Lymphedema Assessment       Right Lower Extremity:  R Metatarsal (cm): 23 cm  R Heel Y Angle: 30.5 cm  R Ankle (cm): 26.5 cm  R 10 cm Above Ankle (cm): 31.4 cm  R 20 cm Above Ankle (cm): 42 cm  R 30 cm Above Ankle (cm): 45 cm  R 40 cm Above Ankle (cm): 0 cm  R Knee (cm): 0 cm  R 10 cm Above Knee (cm): 0 cm  R 20 cm Above Knee (cm): 0 cm  R 30 cm Above Knee (cm): 0 cm  R 40 cm Above Knee (cm): 0 cm  R 50 cm Above Knee (cm): 0 cm  Right lower extremity total: 198.4  Left Lower Extremity:  L Metatarsal (cm): 22 cm  L Heel Y Angle: 31 cm  L Ankle (cm): 28.5 cm  L " 10 cm Above Ankle (cm): 34 cm  L 20 cm Above Ankle (cm): 44 cm  L 30 cm Above Ankle (cm): 45 cm  L 40 cm Above Ankle (cm): 0 cm  L Knee (cm): 0 cm  L 10 cm Above Knee (cm): 0 cm  L 20 cm Above Knee (cm): 0 cm  L 30 cm Above Knee (cm): 0 cm  L 40 cm Above Knee (cm): 0 cm  L 50 cm Above Knee (cm): 0 cm  Left Lower extremity total: 204.5  LE Skin Appearance/Condition and Girth:  Edema - Pitting: yes  Hyperpigmentation: yes  +Stemmer Sign: yes  Other (comment): foot is still blue from bruising    Pain Assessment:  Pain Assessment: 0-10  0-10 (Numeric) Pain Score: 4  Pain Location: Leg  Pain Orientation: Left  Clinical Progression: Gradually improving  Effect of Pain on Daily Activities: decreased mobility  Therapy Precautions:   Medical Precautions: Lymphedema precautions  Precautions Comment: Pt only donned the LLE inelastic wrap (Pt states it is such a struggle, she just did not want to put on the RLE wrap)    OP EDUCATION: Inst pt to take a break after she puts on one wrap. Then proceed to the the other leg. Pt reminded that consistent wear of compression is most effective.       Goals:  Active       OT Goals       OT Goal 1       Start:  04/30/25    Expected End:  10/30/25       STG: Pt will be independent with self management, including use of garments, pump if indicated, self MLD, ability to recognize signs of infection/cellulitis and exercise/HEP to minimize risk of progression of symptoms and skin infections/cellulitis.          OT Goal 2       Start:  04/30/25    Expected End:  10/30/25       STG: Decrease girth in bilateral LEs by 20 cm each for improved mobility and better clothing/shoe fit.           OT Goal 3       Start:  04/30/25    Expected End:  10/30/25       LTG: Pt will show improvement on the LLIS impairment scale to no greater than 30% impaired.              OT Problem       PATIENT STATED GOAL decrease size of legs and pain.       Start:  04/30/25    Expected End:  10/30/25       PATIENT STATED  GOAL decrease size of legs and pain.

## 2025-06-30 ENCOUNTER — APPOINTMENT (OUTPATIENT)
Dept: PULMONOLOGY | Facility: HOSPITAL | Age: 85
End: 2025-06-30
Payer: MEDICARE

## 2025-07-02 ENCOUNTER — TREATMENT (OUTPATIENT)
Dept: OCCUPATIONAL THERAPY | Facility: HOSPITAL | Age: 85
End: 2025-07-02
Payer: MEDICARE

## 2025-07-02 DIAGNOSIS — I89.0 LYMPHEDEMA: Primary | ICD-10-CM

## 2025-07-02 PROCEDURE — 97110 THERAPEUTIC EXERCISES: CPT | Mod: GO | Performed by: OCCUPATIONAL THERAPIST

## 2025-07-02 PROCEDURE — 97535 SELF CARE MNGMENT TRAINING: CPT | Mod: GO | Performed by: OCCUPATIONAL THERAPIST

## 2025-07-02 ASSESSMENT — ACTIVITIES OF DAILY LIVING (ADL): HOME_MANAGEMENT_TIME_ENTRY: 45

## 2025-07-02 NOTE — PROGRESS NOTES
Occupational Therapy    Occupational Therapy Treatment    Name: Jeane Gao  MRN: [id number]  : 1940  Date: 2025  Time Calculation  Start Time: 1100  Stop Time: 1200  Time Calculation (min): 60 min     OT Therapeutic Procedures Time Entry  Self Care/Home Management (ADLs) Time Entry: 45  Therapeutic Exercise Time Entry: 15     Visit number: 8    Assessment:     Pain after treatment no change  Plan:    Follow up in a month after pt receives the pump.  Ordered new garments.      Subjective   General:    Pt has not received the pump.   Name and  confirmed  Objective    Therapy/Activity:   SELF CARE: Reviewed snugging up the straps on the wrap.  Trialed the longer version of the wrap.  Pt liked the longer one.  Discussed ordering a second set.  Reached out to the [employer] rep stated waiting on signed LMN from doctor.  Reviewed all aspects of home management.   THERAPEUTIC EXERCISE: Reviewed seated exercises: ankle circles/pumps, knee flexion/extension, seated march.   Lymphedema Assessment       Right Lower Extremity:  R Metatarsal (cm): 22.5 cm  R Heel Y Angle: 30 cm  R Ankle (cm): 28.4 cm  R 10 cm Above Ankle (cm): 28 cm  R 20 cm Above Ankle (cm): 40 cm  R 30 cm Above Ankle (cm): 43 cm  R 40 cm Above Ankle (cm): 0 cm  R Knee (cm): 0 cm  R 10 cm Above Knee (cm): 0 cm  R 20 cm Above Knee (cm): 0 cm  R 30 cm Above Knee (cm): 0 cm  R 40 cm Above Knee (cm): 0 cm  R 50 cm Above Knee (cm): 0 cm  Right lower extremity total: 191.9  Left Lower Extremity:  L Metatarsal (cm): 22 cm  L Heel Y Angle: 31 cm  L Ankle (cm): 29.5 cm  L 10 cm Above Ankle (cm): 31 cm  L 20 cm Above Ankle (cm): 41 cm  L 30 cm Above Ankle (cm): 43.5 cm  L 40 cm Above Ankle (cm): 0 cm  L Knee (cm): 0 cm  L 10 cm Above Knee (cm): 0 cm  L 20 cm Above Knee (cm): 0 cm  L 30 cm Above Knee (cm): 0 cm  L 40 cm Above Knee (cm): 0 cm  L 50 cm Above Knee (cm): 0 cm  Left Lower extremity total: 198  LE Skin Appearance/Condition and Girth:  Pitting,  hyperpigmentation.     Pain Assessment:  none   Therapy Precautions:   Standard lymphedema precaution      OP EDUCATION:  Home management.     Goals:  Active       OT Goals       OT Goal 1       Start:  04/30/25    Expected End:  10/30/25       STG: Pt will be independent with self management, including use of garments, pump if indicated, self MLD, ability to recognize signs of infection/cellulitis and exercise/HEP to minimize risk of progression of symptoms and skin infections/cellulitis.          OT Goal 2       Start:  04/30/25    Expected End:  10/30/25       STG: Decrease girth in bilateral LEs by 20 cm each for improved mobility and better clothing/shoe fit.           OT Goal 3       Start:  04/30/25    Expected End:  10/30/25       LTG: Pt will show improvement on the LLIS impairment scale to no greater than 30% impaired.              OT Problem       PATIENT STATED GOAL decrease size of legs and pain.       Start:  04/30/25    Expected End:  10/30/25       PATIENT STATED GOAL decrease size of legs and pain.

## 2025-08-05 ENCOUNTER — TREATMENT (OUTPATIENT)
Dept: OCCUPATIONAL THERAPY | Facility: HOSPITAL | Age: 85
End: 2025-08-05
Payer: MEDICARE

## 2025-08-05 DIAGNOSIS — I89.0 LYMPHEDEMA: ICD-10-CM

## 2025-08-05 PROCEDURE — 97535 SELF CARE MNGMENT TRAINING: CPT | Mod: GO | Performed by: OCCUPATIONAL THERAPIST

## 2025-08-05 ASSESSMENT — ACTIVITIES OF DAILY LIVING (ADL): HOME_MANAGEMENT_TIME_ENTRY: 55

## 2025-08-05 NOTE — PROGRESS NOTES
Occupational Therapy    Occupational Therapy Treatment    Name: Jeane Gao  MRN: [id number]  : 1940  Date: 2025  Time Calculation  Start Time: 1110  Stop Time: 1205  Time Calculation (min): 55 min     OT Therapeutic Procedures Time Entry  Self Care/Home Management (ADLs) Time Entry: 55     Visit number: 9    Assessment:  Decrease in girth from previous.   Pain after treatment none  Plan:  follow up in 3 months, plan discharge at that time.       Subjective   General:    Pt states she received the pump but has not set up or used yet.  States she received the longer wrap but has not taken out of the package yet.   Name and  confirmed  Objective    Therapy/Activity:   SELF CARE: Discussed set up of the pump. Pt had the wrap on the left leg.  It was a little loose.  Reviewed proper application of the garments.  Pt thought we had only order one of the longer wraps.  Checked order and right and left have been ordered. Educated on [place] medicare coverage rules. Gave pt info regarding use of a rebounder and vibration plate for lymphedema.  Handouts provided.  Cautioned pt regarding having to step up on to these items.  Gave handout and reviewed HEP.       Right Lower Extremity:  R Metatarsal (cm): 22 cm  R Heel Y Angle: 30 cm  R Ankle (cm): 28.5 cm  R 10 cm Above Ankle (cm): 28 cm  R 20 cm Above Ankle (cm): 41 cm  R 30 cm Above Ankle (cm): 41 cm  R 40 cm Above Ankle (cm): 0 cm  R Knee (cm): 0 cm  R 10 cm Above Knee (cm): 0 cm  R 20 cm Above Knee (cm): 0 cm  R 30 cm Above Knee (cm): 0 cm  R 40 cm Above Knee (cm): 0 cm  R 50 cm Above Knee (cm): 0 cm  Right lower extremity total: 190.5  Left Lower Extremity:  L Metatarsal (cm): 21.8 cm  L Heel Y Angle: 30 cm  L Ankle (cm): 28.5 cm  L 10 cm Above Ankle (cm): 29.2 cm  L 20 cm Above Ankle (cm): 39.5 cm  L 30 cm Above Ankle (cm): 42 cm  L 40 cm Above Ankle (cm): 0 cm  L Knee (cm): 0 cm  L 10 cm Above Knee (cm): 0 cm  L 20 cm Above Knee (cm): 0 cm  L 30 cm Above Knee  (cm): 0 cm  L 40 cm Above Knee (cm): 0 cm  L 50 cm Above Knee (cm): 0 cm  Left Lower extremity total: 191  LE Skin Appearance/Condition and Girth:  As previous    Pain Assessment:  None reported  Therapy Precautions:   none   OP EDUCATION:  Use of wraps, HEp     Goals:  Active       OT Goals       OT Goal 1       Start:  04/30/25    Expected End:  10/30/25       STG: Pt will be independent with self management, including use of garments, pump if indicated, self MLD, ability to recognize signs of infection/cellulitis and exercise/HEP to minimize risk of progression of symptoms and skin infections/cellulitis.          OT Goal 2       Start:  04/30/25    Expected End:  10/30/25       STG: Decrease girth in bilateral LEs by 20 cm each for improved mobility and better clothing/shoe fit.           OT Goal 3       Start:  04/30/25    Expected End:  10/30/25       LTG: Pt will show improvement on the LLIS impairment scale to no greater than 30% impaired.              OT Problem       PATIENT STATED GOAL decrease size of legs and pain.       Start:  04/30/25    Expected End:  10/30/25       PATIENT STATED GOAL decrease size of legs and pain.